# Patient Record
Sex: MALE | Race: WHITE | NOT HISPANIC OR LATINO | Employment: UNEMPLOYED | URBAN - METROPOLITAN AREA
[De-identification: names, ages, dates, MRNs, and addresses within clinical notes are randomized per-mention and may not be internally consistent; named-entity substitution may affect disease eponyms.]

---

## 2020-09-28 ENCOUNTER — HOSPITAL ENCOUNTER (INPATIENT)
Facility: MEDICAL CENTER | Age: 50
LOS: 3 days | DRG: 482 | End: 2020-10-01
Attending: EMERGENCY MEDICINE | Admitting: STUDENT IN AN ORGANIZED HEALTH CARE EDUCATION/TRAINING PROGRAM
Payer: COMMERCIAL

## 2020-09-28 ENCOUNTER — APPOINTMENT (OUTPATIENT)
Dept: RADIOLOGY | Facility: MEDICAL CENTER | Age: 50
DRG: 482 | End: 2020-09-28
Attending: EMERGENCY MEDICINE
Payer: COMMERCIAL

## 2020-09-28 DIAGNOSIS — S72.002D CLOSED FRACTURE OF NECK OF LEFT FEMUR WITH ROUTINE HEALING, SUBSEQUENT ENCOUNTER: ICD-10-CM

## 2020-09-28 PROBLEM — S72.002A CLOSED FRACTURE OF NECK OF LEFT FEMUR (HCC): Status: ACTIVE | Noted: 2020-09-28

## 2020-09-28 LAB — COVID ORDER STATUS COVID19: NORMAL

## 2020-09-28 PROCEDURE — U0003 INFECTIOUS AGENT DETECTION BY NUCLEIC ACID (DNA OR RNA); SEVERE ACUTE RESPIRATORY SYNDROME CORONAVIRUS 2 (SARS-COV-2) (CORONAVIRUS DISEASE [COVID-19]), AMPLIFIED PROBE TECHNIQUE, MAKING USE OF HIGH THROUGHPUT TECHNOLOGIES AS DESCRIBED BY CMS-2020-01-R: HCPCS

## 2020-09-28 PROCEDURE — 73502 X-RAY EXAM HIP UNI 2-3 VIEWS: CPT | Mod: LT

## 2020-09-28 PROCEDURE — 96374 THER/PROPH/DIAG INJ IV PUSH: CPT

## 2020-09-28 PROCEDURE — C9803 HOPD COVID-19 SPEC COLLECT: HCPCS | Performed by: STUDENT IN AN ORGANIZED HEALTH CARE EDUCATION/TRAINING PROGRAM

## 2020-09-28 PROCEDURE — 96372 THER/PROPH/DIAG INJ SC/IM: CPT

## 2020-09-28 PROCEDURE — 700111 HCHG RX REV CODE 636 W/ 250 OVERRIDE (IP): Performed by: EMERGENCY MEDICINE

## 2020-09-28 PROCEDURE — 99223 1ST HOSP IP/OBS HIGH 75: CPT | Performed by: STUDENT IN AN ORGANIZED HEALTH CARE EDUCATION/TRAINING PROGRAM

## 2020-09-28 PROCEDURE — 700111 HCHG RX REV CODE 636 W/ 250 OVERRIDE (IP): Performed by: STUDENT IN AN ORGANIZED HEALTH CARE EDUCATION/TRAINING PROGRAM

## 2020-09-28 PROCEDURE — 99285 EMERGENCY DEPT VISIT HI MDM: CPT

## 2020-09-28 PROCEDURE — 770006 HCHG ROOM/CARE - MED/SURG/GYN SEMI*

## 2020-09-28 RX ORDER — POLYETHYLENE GLYCOL 3350 17 G/17G
1 POWDER, FOR SOLUTION ORAL
Status: DISCONTINUED | OUTPATIENT
Start: 2020-09-28 | End: 2020-10-01 | Stop reason: HOSPADM

## 2020-09-28 RX ORDER — OXYCODONE HYDROCHLORIDE 5 MG/1
5 TABLET ORAL
Status: DISCONTINUED | OUTPATIENT
Start: 2020-09-28 | End: 2020-10-01 | Stop reason: HOSPADM

## 2020-09-28 RX ORDER — BISACODYL 10 MG
10 SUPPOSITORY, RECTAL RECTAL
Status: DISCONTINUED | OUTPATIENT
Start: 2020-09-28 | End: 2020-10-01 | Stop reason: HOSPADM

## 2020-09-28 RX ORDER — ONDANSETRON 2 MG/ML
4 INJECTION INTRAMUSCULAR; INTRAVENOUS EVERY 4 HOURS PRN
Status: DISCONTINUED | OUTPATIENT
Start: 2020-09-28 | End: 2020-10-01 | Stop reason: HOSPADM

## 2020-09-28 RX ORDER — PROCHLORPERAZINE EDISYLATE 5 MG/ML
5-10 INJECTION INTRAMUSCULAR; INTRAVENOUS EVERY 4 HOURS PRN
Status: DISCONTINUED | OUTPATIENT
Start: 2020-09-28 | End: 2020-10-01 | Stop reason: HOSPADM

## 2020-09-28 RX ORDER — PROMETHAZINE HYDROCHLORIDE 12.5 MG/1
12.5-25 SUPPOSITORY RECTAL EVERY 4 HOURS PRN
Status: DISCONTINUED | OUTPATIENT
Start: 2020-09-28 | End: 2020-10-01 | Stop reason: HOSPADM

## 2020-09-28 RX ORDER — PROMETHAZINE HYDROCHLORIDE 25 MG/1
12.5-25 TABLET ORAL EVERY 4 HOURS PRN
Status: DISCONTINUED | OUTPATIENT
Start: 2020-09-28 | End: 2020-10-01 | Stop reason: HOSPADM

## 2020-09-28 RX ORDER — SODIUM CHLORIDE, SODIUM LACTATE, POTASSIUM CHLORIDE, CALCIUM CHLORIDE 600; 310; 30; 20 MG/100ML; MG/100ML; MG/100ML; MG/100ML
INJECTION, SOLUTION INTRAVENOUS CONTINUOUS
Status: DISCONTINUED | OUTPATIENT
Start: 2020-09-28 | End: 2020-09-30

## 2020-09-28 RX ORDER — OXYCODONE HYDROCHLORIDE 5 MG/1
2.5 TABLET ORAL
Status: DISCONTINUED | OUTPATIENT
Start: 2020-09-28 | End: 2020-10-01 | Stop reason: HOSPADM

## 2020-09-28 RX ORDER — MORPHINE SULFATE 4 MG/ML
2 INJECTION, SOLUTION INTRAMUSCULAR; INTRAVENOUS
Status: DISCONTINUED | OUTPATIENT
Start: 2020-09-28 | End: 2020-10-01 | Stop reason: HOSPADM

## 2020-09-28 RX ORDER — ONDANSETRON 4 MG/1
4 TABLET, ORALLY DISINTEGRATING ORAL EVERY 4 HOURS PRN
Status: DISCONTINUED | OUTPATIENT
Start: 2020-09-28 | End: 2020-10-01 | Stop reason: HOSPADM

## 2020-09-28 RX ORDER — ENALAPRILAT 1.25 MG/ML
1.25 INJECTION INTRAVENOUS EVERY 6 HOURS PRN
Status: DISCONTINUED | OUTPATIENT
Start: 2020-09-28 | End: 2020-10-01 | Stop reason: HOSPADM

## 2020-09-28 RX ORDER — AMOXICILLIN 250 MG
2 CAPSULE ORAL 2 TIMES DAILY
Status: DISCONTINUED | OUTPATIENT
Start: 2020-09-28 | End: 2020-10-01 | Stop reason: HOSPADM

## 2020-09-28 RX ORDER — ACETAMINOPHEN 325 MG/1
650 TABLET ORAL EVERY 6 HOURS PRN
Status: DISCONTINUED | OUTPATIENT
Start: 2020-09-28 | End: 2020-10-01 | Stop reason: HOSPADM

## 2020-09-28 RX ADMIN — FENTANYL CITRATE 100 MCG: 50 INJECTION INTRAMUSCULAR; INTRAVENOUS at 19:31

## 2020-09-28 RX ADMIN — MORPHINE SULFATE 4 MG: 4 INJECTION INTRAVENOUS at 22:55

## 2020-09-28 SDOH — HEALTH STABILITY: MENTAL HEALTH: HOW OFTEN DO YOU HAVE A DRINK CONTAINING ALCOHOL?: NEVER

## 2020-09-28 ASSESSMENT — ENCOUNTER SYMPTOMS
FOCAL WEAKNESS: 1
FALLS: 1

## 2020-09-28 ASSESSMENT — PAIN DESCRIPTION - PAIN TYPE: TYPE: ACUTE PAIN

## 2020-09-29 ENCOUNTER — APPOINTMENT (OUTPATIENT)
Dept: RADIOLOGY | Facility: MEDICAL CENTER | Age: 50
DRG: 482 | End: 2020-09-29
Attending: ORTHOPAEDIC SURGERY
Payer: COMMERCIAL

## 2020-09-29 ENCOUNTER — ANESTHESIA EVENT (OUTPATIENT)
Dept: SURGERY | Facility: MEDICAL CENTER | Age: 50
DRG: 482 | End: 2020-09-29
Payer: COMMERCIAL

## 2020-09-29 ENCOUNTER — ANESTHESIA (OUTPATIENT)
Dept: SURGERY | Facility: MEDICAL CENTER | Age: 50
DRG: 482 | End: 2020-09-29
Payer: COMMERCIAL

## 2020-09-29 LAB
ALBUMIN SERPL BCP-MCNC: 4.2 G/DL (ref 3.2–4.9)
ALBUMIN/GLOB SERPL: 1.9 G/DL
ALP SERPL-CCNC: 88 U/L (ref 30–99)
ALT SERPL-CCNC: 18 U/L (ref 2–50)
ANION GAP SERPL CALC-SCNC: 15 MMOL/L (ref 7–16)
AST SERPL-CCNC: 25 U/L (ref 12–45)
BASOPHILS # BLD AUTO: 0.6 % (ref 0–1.8)
BASOPHILS # BLD: 0.08 K/UL (ref 0–0.12)
BILIRUB SERPL-MCNC: 0.6 MG/DL (ref 0.1–1.5)
BUN SERPL-MCNC: 16 MG/DL (ref 8–22)
CALCIUM SERPL-MCNC: 8.8 MG/DL (ref 8.5–10.5)
CHLORIDE SERPL-SCNC: 100 MMOL/L (ref 96–112)
CHOLEST SERPL-MCNC: 125 MG/DL (ref 100–199)
CO2 SERPL-SCNC: 22 MMOL/L (ref 20–33)
CREAT SERPL-MCNC: 0.52 MG/DL (ref 0.5–1.4)
EOSINOPHIL # BLD AUTO: 0.02 K/UL (ref 0–0.51)
EOSINOPHIL NFR BLD: 0.1 % (ref 0–6.9)
ERYTHROCYTE [DISTWIDTH] IN BLOOD BY AUTOMATED COUNT: 41.8 FL (ref 35.9–50)
GLOBULIN SER CALC-MCNC: 2.2 G/DL (ref 1.9–3.5)
GLUCOSE SERPL-MCNC: 117 MG/DL (ref 65–99)
HCT VFR BLD AUTO: 41.1 % (ref 42–52)
HDLC SERPL-MCNC: 47 MG/DL
HGB BLD-MCNC: 13.8 G/DL (ref 14–18)
IMM GRANULOCYTES # BLD AUTO: 0.06 K/UL (ref 0–0.11)
IMM GRANULOCYTES NFR BLD AUTO: 0.4 % (ref 0–0.9)
INR PPP: 1.07 (ref 0.87–1.13)
LDLC SERPL CALC-MCNC: 72 MG/DL
LYMPHOCYTES # BLD AUTO: 1.44 K/UL (ref 1–4.8)
LYMPHOCYTES NFR BLD: 10.2 % (ref 22–41)
MCH RBC QN AUTO: 30.4 PG (ref 27–33)
MCHC RBC AUTO-ENTMCNC: 33.6 G/DL (ref 33.7–35.3)
MCV RBC AUTO: 90.5 FL (ref 81.4–97.8)
MONOCYTES # BLD AUTO: 0.85 K/UL (ref 0–0.85)
MONOCYTES NFR BLD AUTO: 6 % (ref 0–13.4)
NEUTROPHILS # BLD AUTO: 11.63 K/UL (ref 1.82–7.42)
NEUTROPHILS NFR BLD: 82.7 % (ref 44–72)
NRBC # BLD AUTO: 0 K/UL
NRBC BLD-RTO: 0 /100 WBC
PLATELET # BLD AUTO: 255 K/UL (ref 164–446)
PMV BLD AUTO: 10.3 FL (ref 9–12.9)
POTASSIUM SERPL-SCNC: 4 MMOL/L (ref 3.6–5.5)
PROT SERPL-MCNC: 6.4 G/DL (ref 6–8.2)
PROTHROMBIN TIME: 14.2 SEC (ref 12–14.6)
RBC # BLD AUTO: 4.54 M/UL (ref 4.7–6.1)
SARS-COV-2 RNA RESP QL NAA+PROBE: NOTDETECTED
SODIUM SERPL-SCNC: 137 MMOL/L (ref 135–145)
SPECIMEN SOURCE: NORMAL
TRIGL SERPL-MCNC: 28 MG/DL (ref 0–149)
WBC # BLD AUTO: 14.1 K/UL (ref 4.8–10.8)

## 2020-09-29 PROCEDURE — 160029 HCHG SURGERY MINUTES - 1ST 30 MINS LEVEL 4: Performed by: ORTHOPAEDIC SURGERY

## 2020-09-29 PROCEDURE — 99231 SBSQ HOSP IP/OBS SF/LOW 25: CPT | Performed by: STUDENT IN AN ORGANIZED HEALTH CARE EDUCATION/TRAINING PROGRAM

## 2020-09-29 PROCEDURE — C1713 ANCHOR/SCREW BN/BN,TIS/BN: HCPCS | Performed by: ORTHOPAEDIC SURGERY

## 2020-09-29 PROCEDURE — 770006 HCHG ROOM/CARE - MED/SURG/GYN SEMI*

## 2020-09-29 PROCEDURE — 73502 X-RAY EXAM HIP UNI 2-3 VIEWS: CPT | Mod: LT

## 2020-09-29 PROCEDURE — 80053 COMPREHEN METABOLIC PANEL: CPT

## 2020-09-29 PROCEDURE — 501838 HCHG SUTURE GENERAL: Performed by: ORTHOPAEDIC SURGERY

## 2020-09-29 PROCEDURE — 0QS736Z REPOSITION LEFT UPPER FEMUR WITH INTRAMEDULLARY INTERNAL FIXATION DEVICE, PERCUTANEOUS APPROACH: ICD-10-PCS | Performed by: ORTHOPAEDIC SURGERY

## 2020-09-29 PROCEDURE — 160048 HCHG OR STATISTICAL LEVEL 1-5: Performed by: ORTHOPAEDIC SURGERY

## 2020-09-29 PROCEDURE — A9270 NON-COVERED ITEM OR SERVICE: HCPCS | Performed by: STUDENT IN AN ORGANIZED HEALTH CARE EDUCATION/TRAINING PROGRAM

## 2020-09-29 PROCEDURE — 160035 HCHG PACU - 1ST 60 MINS PHASE I: Performed by: ORTHOPAEDIC SURGERY

## 2020-09-29 PROCEDURE — 160009 HCHG ANES TIME/MIN: Performed by: ORTHOPAEDIC SURGERY

## 2020-09-29 PROCEDURE — 160041 HCHG SURGERY MINUTES - EA ADDL 1 MIN LEVEL 4: Performed by: ORTHOPAEDIC SURGERY

## 2020-09-29 PROCEDURE — 80061 LIPID PANEL: CPT

## 2020-09-29 PROCEDURE — 85025 COMPLETE CBC W/AUTO DIFF WBC: CPT

## 2020-09-29 PROCEDURE — 700111 HCHG RX REV CODE 636 W/ 250 OVERRIDE (IP): Performed by: ANESTHESIOLOGY

## 2020-09-29 PROCEDURE — 160002 HCHG RECOVERY MINUTES (STAT): Performed by: ORTHOPAEDIC SURGERY

## 2020-09-29 PROCEDURE — 85610 PROTHROMBIN TIME: CPT

## 2020-09-29 PROCEDURE — 700105 HCHG RX REV CODE 258: Performed by: ANESTHESIOLOGY

## 2020-09-29 PROCEDURE — 700101 HCHG RX REV CODE 250: Performed by: ANESTHESIOLOGY

## 2020-09-29 PROCEDURE — 500891 HCHG PACK, ORTHO MAJOR: Performed by: ORTHOPAEDIC SURGERY

## 2020-09-29 PROCEDURE — 700102 HCHG RX REV CODE 250 W/ 637 OVERRIDE(OP): Performed by: STUDENT IN AN ORGANIZED HEALTH CARE EDUCATION/TRAINING PROGRAM

## 2020-09-29 PROCEDURE — 700102 HCHG RX REV CODE 250 W/ 637 OVERRIDE(OP): Performed by: ANESTHESIOLOGY

## 2020-09-29 PROCEDURE — 700111 HCHG RX REV CODE 636 W/ 250 OVERRIDE (IP): Performed by: STUDENT IN AN ORGANIZED HEALTH CARE EDUCATION/TRAINING PROGRAM

## 2020-09-29 PROCEDURE — 36415 COLL VENOUS BLD VENIPUNCTURE: CPT

## 2020-09-29 PROCEDURE — 700105 HCHG RX REV CODE 258: Performed by: STUDENT IN AN ORGANIZED HEALTH CARE EDUCATION/TRAINING PROGRAM

## 2020-09-29 PROCEDURE — A9270 NON-COVERED ITEM OR SERVICE: HCPCS | Performed by: ANESTHESIOLOGY

## 2020-09-29 DEVICE — SCREW CROSS LOCK 5MM X 30MM (4TX5=20): Type: IMPLANTABLE DEVICE | Site: HIP | Status: FUNCTIONAL

## 2020-09-29 DEVICE — NAIL HIP 127.5 DEGREE 10MM X 180MM (4TX2=8): Type: IMPLANTABLE DEVICE | Site: HIP | Status: FUNCTIONAL

## 2020-09-29 DEVICE — SCREW LAG 10.5MM X 100MM (4TX2=8): Type: IMPLANTABLE DEVICE | Site: HIP | Status: FUNCTIONAL

## 2020-09-29 RX ORDER — DEXAMETHASONE SODIUM PHOSPHATE 4 MG/ML
INJECTION, SOLUTION INTRA-ARTICULAR; INTRALESIONAL; INTRAMUSCULAR; INTRAVENOUS; SOFT TISSUE PRN
Status: DISCONTINUED | OUTPATIENT
Start: 2020-09-29 | End: 2020-09-29 | Stop reason: SURG

## 2020-09-29 RX ORDER — HYDROMORPHONE HYDROCHLORIDE 1 MG/ML
0.4 INJECTION, SOLUTION INTRAMUSCULAR; INTRAVENOUS; SUBCUTANEOUS
Status: DISCONTINUED | OUTPATIENT
Start: 2020-09-29 | End: 2020-09-29 | Stop reason: HOSPADM

## 2020-09-29 RX ORDER — CEFAZOLIN SODIUM 1 G/50ML
1 INJECTION, SOLUTION INTRAVENOUS EVERY 8 HOURS
Status: COMPLETED | OUTPATIENT
Start: 2020-09-29 | End: 2020-09-30

## 2020-09-29 RX ORDER — KETOROLAC TROMETHAMINE 30 MG/ML
INJECTION, SOLUTION INTRAMUSCULAR; INTRAVENOUS PRN
Status: DISCONTINUED | OUTPATIENT
Start: 2020-09-29 | End: 2020-09-29 | Stop reason: SURG

## 2020-09-29 RX ORDER — MIDAZOLAM HYDROCHLORIDE 1 MG/ML
1 INJECTION INTRAMUSCULAR; INTRAVENOUS
Status: DISCONTINUED | OUTPATIENT
Start: 2020-09-29 | End: 2020-09-29 | Stop reason: HOSPADM

## 2020-09-29 RX ORDER — CEFAZOLIN SODIUM 1 G/3ML
INJECTION, POWDER, FOR SOLUTION INTRAMUSCULAR; INTRAVENOUS PRN
Status: DISCONTINUED | OUTPATIENT
Start: 2020-09-29 | End: 2020-09-29 | Stop reason: SURG

## 2020-09-29 RX ORDER — HYDROMORPHONE HYDROCHLORIDE 1 MG/ML
0.1 INJECTION, SOLUTION INTRAMUSCULAR; INTRAVENOUS; SUBCUTANEOUS
Status: DISCONTINUED | OUTPATIENT
Start: 2020-09-29 | End: 2020-09-29 | Stop reason: HOSPADM

## 2020-09-29 RX ORDER — SODIUM CHLORIDE, SODIUM LACTATE, POTASSIUM CHLORIDE, CALCIUM CHLORIDE 600; 310; 30; 20 MG/100ML; MG/100ML; MG/100ML; MG/100ML
INJECTION, SOLUTION INTRAVENOUS CONTINUOUS
Status: DISCONTINUED | OUTPATIENT
Start: 2020-09-29 | End: 2020-09-29 | Stop reason: HOSPADM

## 2020-09-29 RX ORDER — ONDANSETRON 2 MG/ML
4 INJECTION INTRAMUSCULAR; INTRAVENOUS
Status: DISCONTINUED | OUTPATIENT
Start: 2020-09-29 | End: 2020-09-29 | Stop reason: HOSPADM

## 2020-09-29 RX ORDER — METOPROLOL TARTRATE 1 MG/ML
1 INJECTION, SOLUTION INTRAVENOUS
Status: DISCONTINUED | OUTPATIENT
Start: 2020-09-29 | End: 2020-09-29 | Stop reason: HOSPADM

## 2020-09-29 RX ORDER — MEPERIDINE HYDROCHLORIDE 25 MG/ML
12.5 INJECTION INTRAMUSCULAR; INTRAVENOUS; SUBCUTANEOUS
Status: DISCONTINUED | OUTPATIENT
Start: 2020-09-29 | End: 2020-09-29 | Stop reason: HOSPADM

## 2020-09-29 RX ORDER — HALOPERIDOL 5 MG/ML
1 INJECTION INTRAMUSCULAR
Status: DISCONTINUED | OUTPATIENT
Start: 2020-09-29 | End: 2020-09-29 | Stop reason: HOSPADM

## 2020-09-29 RX ORDER — OXYCODONE HCL 5 MG/5 ML
5 SOLUTION, ORAL ORAL
Status: COMPLETED | OUTPATIENT
Start: 2020-09-29 | End: 2020-09-29

## 2020-09-29 RX ORDER — OXYCODONE HCL 5 MG/5 ML
10 SOLUTION, ORAL ORAL
Status: COMPLETED | OUTPATIENT
Start: 2020-09-29 | End: 2020-09-29

## 2020-09-29 RX ORDER — LABETALOL HYDROCHLORIDE 5 MG/ML
5 INJECTION, SOLUTION INTRAVENOUS
Status: DISCONTINUED | OUTPATIENT
Start: 2020-09-29 | End: 2020-09-29 | Stop reason: HOSPADM

## 2020-09-29 RX ORDER — HYDROMORPHONE HYDROCHLORIDE 1 MG/ML
0.2 INJECTION, SOLUTION INTRAMUSCULAR; INTRAVENOUS; SUBCUTANEOUS
Status: DISCONTINUED | OUTPATIENT
Start: 2020-09-29 | End: 2020-09-29 | Stop reason: HOSPADM

## 2020-09-29 RX ORDER — SODIUM CHLORIDE, SODIUM LACTATE, POTASSIUM CHLORIDE, CALCIUM CHLORIDE 600; 310; 30; 20 MG/100ML; MG/100ML; MG/100ML; MG/100ML
INJECTION, SOLUTION INTRAVENOUS
Status: DISCONTINUED | OUTPATIENT
Start: 2020-09-29 | End: 2020-09-29 | Stop reason: SURG

## 2020-09-29 RX ORDER — ONDANSETRON 2 MG/ML
INJECTION INTRAMUSCULAR; INTRAVENOUS PRN
Status: DISCONTINUED | OUTPATIENT
Start: 2020-09-29 | End: 2020-09-29 | Stop reason: SURG

## 2020-09-29 RX ORDER — DIPHENHYDRAMINE HYDROCHLORIDE 50 MG/ML
12.5 INJECTION INTRAMUSCULAR; INTRAVENOUS
Status: DISCONTINUED | OUTPATIENT
Start: 2020-09-29 | End: 2020-09-29 | Stop reason: HOSPADM

## 2020-09-29 RX ORDER — LIDOCAINE HYDROCHLORIDE 20 MG/ML
INJECTION, SOLUTION EPIDURAL; INFILTRATION; INTRACAUDAL; PERINEURAL PRN
Status: DISCONTINUED | OUTPATIENT
Start: 2020-09-29 | End: 2020-09-29 | Stop reason: SURG

## 2020-09-29 RX ADMIN — MORPHINE SULFATE 2 MG: 4 INJECTION INTRAVENOUS at 02:21

## 2020-09-29 RX ADMIN — SODIUM CHLORIDE, POTASSIUM CHLORIDE, SODIUM LACTATE AND CALCIUM CHLORIDE: 600; 310; 30; 20 INJECTION, SOLUTION INTRAVENOUS at 00:11

## 2020-09-29 RX ADMIN — DOCUSATE SODIUM 50 MG AND SENNOSIDES 8.6 MG 2 TABLET: 8.6; 5 TABLET, FILM COATED ORAL at 17:09

## 2020-09-29 RX ADMIN — ACETAMINOPHEN 650 MG: 325 TABLET, FILM COATED ORAL at 08:32

## 2020-09-29 RX ADMIN — FENTANYL CITRATE 50 MCG: 50 INJECTION, SOLUTION INTRAMUSCULAR; INTRAVENOUS at 14:54

## 2020-09-29 RX ADMIN — FENTANYL CITRATE 50 MCG: 50 INJECTION, SOLUTION INTRAMUSCULAR; INTRAVENOUS at 14:48

## 2020-09-29 RX ADMIN — SODIUM CHLORIDE, POTASSIUM CHLORIDE, SODIUM LACTATE AND CALCIUM CHLORIDE: 600; 310; 30; 20 INJECTION, SOLUTION INTRAVENOUS at 00:10

## 2020-09-29 RX ADMIN — DEXAMETHASONE SODIUM PHOSPHATE 8 MG: 4 INJECTION, SOLUTION INTRA-ARTICULAR; INTRALESIONAL; INTRAMUSCULAR; INTRAVENOUS; SOFT TISSUE at 14:36

## 2020-09-29 RX ADMIN — ONDANSETRON 4 MG: 2 INJECTION INTRAMUSCULAR; INTRAVENOUS at 14:58

## 2020-09-29 RX ADMIN — SODIUM CHLORIDE, POTASSIUM CHLORIDE, SODIUM LACTATE AND CALCIUM CHLORIDE: 600; 310; 30; 20 INJECTION, SOLUTION INTRAVENOUS at 19:52

## 2020-09-29 RX ADMIN — FENTANYL CITRATE 100 MCG: 50 INJECTION, SOLUTION INTRAMUSCULAR; INTRAVENOUS at 14:36

## 2020-09-29 RX ADMIN — KETOROLAC TROMETHAMINE 30 MG: 30 INJECTION, SOLUTION INTRAMUSCULAR at 14:58

## 2020-09-29 RX ADMIN — LIDOCAINE HYDROCHLORIDE 60 MG: 20 INJECTION, SOLUTION EPIDURAL; INFILTRATION; INTRACAUDAL at 14:36

## 2020-09-29 RX ADMIN — ACETAMINOPHEN 650 MG: 325 TABLET, FILM COATED ORAL at 00:20

## 2020-09-29 RX ADMIN — PROPOFOL 150 MG: 10 INJECTION, EMULSION INTRAVENOUS at 14:36

## 2020-09-29 RX ADMIN — EPHEDRINE SULFATE 10 MG: 50 INJECTION, SOLUTION INTRAVENOUS at 14:38

## 2020-09-29 RX ADMIN — SODIUM CHLORIDE, POTASSIUM CHLORIDE, SODIUM LACTATE AND CALCIUM CHLORIDE: 600; 310; 30; 20 INJECTION, SOLUTION INTRAVENOUS at 14:30

## 2020-09-29 RX ADMIN — PROPOFOL 50 MG: 10 INJECTION, EMULSION INTRAVENOUS at 14:54

## 2020-09-29 RX ADMIN — SODIUM CHLORIDE, POTASSIUM CHLORIDE, SODIUM LACTATE AND CALCIUM CHLORIDE: 600; 310; 30; 20 INJECTION, SOLUTION INTRAVENOUS at 17:09

## 2020-09-29 RX ADMIN — CEFAZOLIN 2 G: 330 INJECTION, POWDER, FOR SOLUTION INTRAMUSCULAR; INTRAVENOUS at 14:36

## 2020-09-29 RX ADMIN — OXYCODONE HYDROCHLORIDE 5 MG: 5 SOLUTION ORAL at 16:05

## 2020-09-29 SDOH — ECONOMIC STABILITY: TRANSPORTATION INSECURITY
IN THE PAST 12 MONTHS, HAS THE LACK OF TRANSPORTATION KEPT YOU FROM MEDICAL APPOINTMENTS OR FROM GETTING MEDICATIONS?: NO

## 2020-09-29 SDOH — ECONOMIC STABILITY: FOOD INSECURITY: WITHIN THE PAST 12 MONTHS, YOU WORRIED THAT YOUR FOOD WOULD RUN OUT BEFORE YOU GOT MONEY TO BUY MORE.: NEVER TRUE

## 2020-09-29 SDOH — ECONOMIC STABILITY: FOOD INSECURITY: WITHIN THE PAST 12 MONTHS, THE FOOD YOU BOUGHT JUST DIDN'T LAST AND YOU DIDN'T HAVE MONEY TO GET MORE.: NEVER TRUE

## 2020-09-29 SDOH — ECONOMIC STABILITY: TRANSPORTATION INSECURITY
IN THE PAST 12 MONTHS, HAS LACK OF TRANSPORTATION KEPT YOU FROM MEETINGS, WORK, OR FROM GETTING THINGS NEEDED FOR DAILY LIVING?: NO

## 2020-09-29 ASSESSMENT — ENCOUNTER SYMPTOMS
NAUSEA: 0
DIZZINESS: 0
BACK PAIN: 0
INSOMNIA: 0
BLURRED VISION: 0
FALLS: 1
HEADACHES: 0
VOMITING: 0
CHILLS: 0
EYE PAIN: 0
COUGH: 0
SHORTNESS OF BREATH: 0
ABDOMINAL PAIN: 0
FEVER: 0
PALPITATIONS: 0

## 2020-09-29 ASSESSMENT — COGNITIVE AND FUNCTIONAL STATUS - GENERAL
MOVING FROM LYING ON BACK TO SITTING ON SIDE OF FLAT BED: A LOT
MOBILITY SCORE: 13
STANDING UP FROM CHAIR USING ARMS: A LOT
SUGGESTED CMS G CODE MODIFIER DAILY ACTIVITY: CK
DAILY ACTIVITIY SCORE: 16
DRESSING REGULAR LOWER BODY CLOTHING: A LOT
HELP NEEDED FOR BATHING: A LOT
TOILETING: A LOT
DRESSING REGULAR UPPER BODY CLOTHING: A LITTLE
MOVING TO AND FROM BED TO CHAIR: A LOT
SUGGESTED CMS G CODE MODIFIER MOBILITY: CL
TURNING FROM BACK TO SIDE WHILE IN FLAT BAD: A LITTLE
WALKING IN HOSPITAL ROOM: A LOT
PERSONAL GROOMING: A LITTLE
CLIMB 3 TO 5 STEPS WITH RAILING: A LOT

## 2020-09-29 ASSESSMENT — PATIENT HEALTH QUESTIONNAIRE - PHQ9
2. FEELING DOWN, DEPRESSED, IRRITABLE, OR HOPELESS: NOT AT ALL
1. LITTLE INTEREST OR PLEASURE IN DOING THINGS: NOT AT ALL
SUM OF ALL RESPONSES TO PHQ9 QUESTIONS 1 AND 2: 0

## 2020-09-29 ASSESSMENT — LIFESTYLE VARIABLES
SUBSTANCE_ABUSE: 0
TOTAL SCORE: 0
CONSUMPTION TOTAL: NEGATIVE
HOW MANY TIMES IN THE PAST YEAR HAVE YOU HAD 5 OR MORE DRINKS IN A DAY: 0
TOTAL SCORE: 0
DOES PATIENT WANT TO STOP DRINKING: NO
HAVE PEOPLE ANNOYED YOU BY CRITICIZING YOUR DRINKING: NO
REASON UNABLE TO ASSESS: NPO
HAVE YOU EVER FELT YOU SHOULD CUT DOWN ON YOUR DRINKING: NO
EVER HAD A DRINK FIRST THING IN THE MORNING TO STEADY YOUR NERVES TO GET RID OF A HANGOVER: NO
ALCOHOL_USE: NO
EVER FELT BAD OR GUILTY ABOUT YOUR DRINKING: NO
TOTAL SCORE: 0
AVERAGE NUMBER OF DAYS PER WEEK YOU HAVE A DRINK CONTAINING ALCOHOL: 0
ON A TYPICAL DAY WHEN YOU DRINK ALCOHOL HOW MANY DRINKS DO YOU HAVE: 0

## 2020-09-29 ASSESSMENT — PAIN DESCRIPTION - PAIN TYPE
TYPE: ACUTE PAIN
TYPE: ACUTE PAIN
TYPE: SURGICAL PAIN
TYPE: SURGICAL PAIN
TYPE: ACUTE PAIN

## 2020-09-29 ASSESSMENT — PAIN SCALES - GENERAL: PAIN_LEVEL: 2

## 2020-09-29 NOTE — ANESTHESIA PROCEDURE NOTES
Airway    Date/Time: 9/29/2020 2:36 PM  Performed by: Jah Zamudio D.O.  Authorized by: Jah Zamudio D.O.     Location:  OR  Urgency:  Elective  Indications for Airway Management:  Anesthesia      Spontaneous Ventilation: absent    Sedation Level:  Deep  Preoxygenated: Yes    Final Airway Type:  Supraglottic airway  Final Supraglottic Airway:  Standard LMA    SGA Size:  5  Number of Attempts at Approach:  1

## 2020-09-29 NOTE — H&P
Hospital Medicine History & Physical Note    Date of Service  9/28/2020    Primary Care Physician  No primary care provider on file.    Consultants  orthopedics    Code Status  Full Code    Chief Complaint  Chief Complaint   Patient presents with   • T-5000 FALL     skateboarding and fell   • Hip Pain     can't bear weight.       History of Presenting Illness  50 y.o. male with no past medical history who presented following an accident on a skateboard this evening.  Patient is very active and a cyclist, and has been unable to bear weight since falling a few hours ago.  Xray performed in the ER show Impacted left intertrochanteric femoral neck fracture.  Sclerotic focus in the left femoral neck, commonly associated with bony island, consider other sclerotic bony lesion with additional workup as clinically appropriate.  Orthopedics is consulted and patient is admitted for pain management and treatment as recommended.       Review of Systems  Review of Systems   Musculoskeletal: Positive for falls and joint pain.   Neurological: Positive for focal weakness.   All other systems reviewed and are negative.      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   Constitutional:       General: He is in acute distress.      Appearance: Normal appearance. He is normal weight. He is not ill-appearing or toxic-appearing.   HENT:      Head: Normocephalic and atraumatic.      Left  Ear: External ear normal.      Nose: Nose normal.      Mouth/Throat:      Mouth: Mucous membranes are moist.      Pharynx: Oropharynx is clear.   Eyes:      Extraocular Movements: Extraocular movements intact.      Conjunctiva/sclera: Conjunctivae normal.      Pupils: Pupils are equal, round, and reactive to light.   Neck:      Musculoskeletal: Normal range of motion and neck supple.   Cardiovascular:      Rate and Rhythm: Normal rate and regular rhythm.      Pulses: Normal pulses.      Heart sounds: Normal heart sounds. No murmur. No friction rub. No gallop.    Pulmonary:      Effort: Pulmonary effort is normal. No respiratory distress.      Breath sounds: Normal breath sounds. No stridor. No wheezing, rhonchi or rales.   Abdominal:      General: Abdomen is flat. Bowel sounds are normal. There is no distension.      Palpations: Abdomen is soft. There is no mass.      Tenderness: There is no abdominal tenderness. There is no guarding.   Musculoskeletal:         General: Tenderness (at LLE, no erythema or lesion) present.      Left lower leg: No edema.   Skin:     General: Skin is warm and dry.      Capillary Refill: Capillary refill takes 2 to 3 seconds.      Findings: No rash.   Neurological:      Mental Status: He is alert and oriented to person, place, and time.      Motor: Weakness present.      Gait: Gait abnormal (unable to bear weight at LLE).   Psychiatric:         Mood and Affect: Mood normal.         Behavior: Behavior normal.         Laboratory:          No results for input(s): ALTSGPT, ASTSGOT, ALKPHOSPHAT, TBILIRUBIN, DBILIRUBIN, GAMMAGT, AMYLASE, LIPASE, ALB, PREALBUMIN, GLUCOSE in the last 72 hours.      No results for input(s): NTPROBNP in the last 72 hours.      No results for input(s): TROPONINT in the last 72 hours.    Imaging:  DX-HIP-COMPLETE - UNILATERAL 2+ LEFT   Final Result         1.  Impacted left intertrochanteric femoral neck fracture.   2.  Sclerotic focus in the left femoral neck,  commonly associated with bony island, consider other sclerotic bony lesion with additional workup as clinically appropriate.            Assessment/Plan:  I anticipate this patient will require at least two midnights for appropriate medical management, necessitating inpatient admission.    Closed fracture of neck of left femur (HCC)- (present on admission)  Assessment & Plan  Pain management  Bed rest  Awaiting orthopedic recommendations  NPO

## 2020-09-29 NOTE — CARE PLAN
Pt. Remains free of any new/additional injury since admission to floor. Remains in bed d/t left hip fx. Is currently NPO since 0000 for likely surgery in AM. Call light is in reach. Will monitor.

## 2020-09-29 NOTE — ANESTHESIA TIME REPORT
Anesthesia Start and Stop Event Times     Date Time Event    9/29/2020 1428 Ready for Procedure     1430 Anesthesia Start     1526 Anesthesia Stop        Responsible Staff  09/29/20    Name Role Begin End    Jah Zamudio D.O. Anesth 1430 1526        Preop Diagnosis (Free Text):  Pre-op Diagnosis     Left intertrochanteric hip fracture        Preop Diagnosis (Codes):    Post op Diagnosis  Closed left hip fracture (HCC)      Premium Reason  A. 3PM - 7AM    Comments:

## 2020-09-29 NOTE — CARE PLAN
Problem: Pain Management  Goal: Pain level will decrease to patient's comfort goal  Outcome: PROGRESSING AS EXPECTED  Note: Pt educated on non-pharmacological pain control interventions. Pain well controlled on oral pain medications.     Problem: Safety  Goal: Will remain free from falls  Outcome: PROGRESSING AS EXPECTED  Note: Bed locked and in lowest position. Call light and belongings within reach.     Problem: Communication  Goal: The ability to communicate needs accurately and effectively will improve  Outcome: PROGRESSING AS EXPECTED  Note: Pt updated on POC.

## 2020-09-29 NOTE — OP REPORT
DATE OF SERVICE:  09/29/2020    PREOPERATIVE DIAGNOSIS:  Left intertrochanteric hip fracture.    POSTOPERATIVE DIAGNOSIS:  Left intertrochanteric hip fracture.    SURGICAL PROCEDURES:  1.  Intramedullary nailing, left hip.    SURGEON:  Akash Driver MD    ASSISTANT:  None.    ANESTHESIOLOGIST:  Jah Zamudio DO    ANESTHETIC:  General.    ESTIMATED BLOOD LOSS:  100 mL    INDICATIONS:  The patient is 50 years old, fell off skateboard last night,   injuring his left hip, was found to have a minimally displaced   intertrochanteric hip fracture.  He was admitted to the hospital.  I was asked   by Dr. Stevenson to oversee his definitive orthopedic care.  I recommended   intramedullary nailing.  Risks include bleeding, infection, neurovascular   injury, pain, stiffness, arthritis, nonunion, malunion, fracture,   thromboembolic phenomena, anesthetic and medical complications, etc.    DESCRIPTION OF PROCEDURE:  The patient was identified in the preoperative   holding area.  Left leg was marked.  He was taken to the operating room where   general anesthetic was administered.  Intravenous antibiotics were given.  He   was placed supine on the fracture table.  Feet were placed in the boots.    Fluoroscopic images showed good reduction.  The lateral hip, thigh, and knee   was then prepped and draped in sterile fashion.  Time-out was held to confirm   the patient identity and correct surgical site.    A terminally threaded guidewire was inserted to the tip of the greater   trochanter and then inserted into the proximal femur with appropriate starting   angle.  Incision was made around the wire, then the starter reamer was used   over the wire to open up the medullary canal.  I then inserted an C short   hip nail to the appropriate depth.  I made an incision laterally through which   we placed interlocking sleeve and then we inserted a guidewire into the near   center-center position of the femoral head.  Cannulated  drill was used over   the wire, then a 105 mm lag screw was inserted.  The setscrew was tightened.    The guidewire and oblique interlocking sleeve was removed.  We then placed 1   interlocking screw through the jig and sleeve into the static hole distally.    The jig was removed.  Fluoroscopic images showed good reduction and implant   placement.  Wounds were irrigated and proximal fascia was closed with 0   Vicryl.  Skin was closed with 2-0 Vicryl and staples.  Dressings were applied.    The patient was taken off the fracture table, extubated, and taken to   recovery room in stable condition.    POSTOPERATIVE PLAN:  1.  Intravenous antibiotics for 24 hours.  2.  DVT prophylaxis with early mobilization, SCDs and Lovenox while inpatient,   aspirin 81 mg p.o. b.i.d. for 4 weeks after discharge.  3.  A 50% partial weightbearing for approximately 1 month and then progress to   full weightbearing as tolerated.       ____________________________________     MD JUVE OBRIEN / JOSE    DD:  09/29/2020 15:19:18  DT:  09/29/2020 16:58:19    D#:  [id number]  Job#:  [id number]

## 2020-09-29 NOTE — ED NOTES
Med rec updated and complete. Allergies reviewed. Pt denies taking medications.        Home pharmacy  [facility]

## 2020-09-29 NOTE — CONSULTS
9/28/2020    Time Called: 916PM  Time Arrived: 930PM    Reason for consultation: Left hip fracture    Consultation on Diego Giron at the request of Dr. Ayala for evaluation of left hip fracture.  The patient is a 50 y.o. male who presents with a 1 day history of left hip pain due to a fall while on a skateboard this afternoon.  Patient states he was riding a skateboard when he fell and landed on his left hip.  He was unable to ambulate thereafter, he was able to crawl to a car which brought him to the hospital.  He describes his pain as a deep and achy pain which is worse with motion, worse with attempted ambulation, and better with immobilization and rest.  He denies any other injuries at the time of the fall.  He denies any past medical history       History reviewed. No pertinent past medical history.    History reviewed. No pertinent surgical history.    Medications  No current facility-administered medications on file prior to encounter.      No current outpatient medications on file prior to encounter.       Allergies  Patient has no known allergies.    ROS  General Constitutional: Patient reports no changes in general health lately   Eyes: Patient denies vision abnormalities  ENT: Patient denies vertigo or balance issues   Cardiovascular: Patient currently denies chest pain  Respiratory: Patient currently denies shortness of breath   Gastrointestinal: Patient currently denies nausea/vomiting  Integumentary: no new skin lesions/rashes  Psychiatric: Patient denies h/o psychiatric conditions/mood changes  Hematologic / Lymphatic: Patient denies bleeding or bruising  Allergic / Immunologic: Patient denies recent immunologic problems    History reviewed. No pertinent family history.    Social History     Socioeconomic History   • Marital status: Single     Spouse name: Not on file   • Number of children: Not on file   • Years of education: Not on file   • Highest education level: Not on file  "  Occupational History   • Not on file   Social Needs   • Financial resource strain: Not on file   • Food insecurity     Worry: Not on file     Inability: Not on file   • Transportation needs     Medical: Not on file     Non-medical: Not on file   Tobacco Use   • Smoking status: Never Smoker   • Smokeless tobacco: Never Used   Substance and Sexual Activity   • Alcohol use: Never     Frequency: Never   • Drug use: Never   • Sexual activity: Not on file   Lifestyle   • Physical activity     Days per week: Not on file     Minutes per session: Not on file   • Stress: Not on file   Relationships   • Social connections     Talks on phone: Not on file     Gets together: Not on file     Attends [employer] service: Not on file     Active member of club or organization: Not on file     Attends meetings of clubs or organizations: Not on file     Relationship status: Not on file   • Intimate partner violence     Fear of current or ex partner: Not on file     Emotionally abused: Not on file     Physically abused: Not on file     Forced sexual activity: Not on file   Other Topics Concern   • Not on file   Social History Narrative   • Not on file       Physical Exam  Vitals  /85   Pulse (!) 103   Temp 37.2 °C (99 °F) (Temporal)   Resp 12   Ht 1.753 m (5' 9\")   Wt 65.8 kg (145 lb)   SpO2 98%   General: Well Developed, Well Nourished, no acute distress  Psychiatric: Alert and oriented x3, appropriate responses to questions, pleasant mood and affect.  HEENT: Normocephalic, atraumatic  Eyes: Anicteric, EOMI  Neck: Supple, trachea midline  Chest: Symmetric expansion of the chest wall  Heart: RRR, palpable peripheral pulses  Abdomen: Soft, NT, ND  Skin: Intact, no open wounds  Musculoskeletal: Bilateral upper extremity examination shows no bony tenderness to palpation through the shoulder girdle, elbows, wrists, hands.  Good strength and motion in the upper extremities.  Pelvis examination shows no pain with compression or " rotation of the pelvis  Right lower extremity examination shows no pain with logroll good strength and range of motion at the level of the hip knee ankle and foot, no bony tenderness throughout the right lower extremity.  Left lower extremity shows no open wounds or abrasions.  There is tenderness to palpation over the greater trochanter of the left hip, there is pain with logroll of the hip  There is good strength at the ankle, decrease strength at the knee, no pain throughout the foot  Neuro: Sensation is intact throughout bilateral upper and lower extremities, strength examination as above  Vascular: Palpable peripheral pulses, Capillary refill <2 seconds    Radiographs:  DX-HIP-COMPLETE - UNILATERAL 2+ LEFT   Final Result         1.  Impacted left intertrochanteric femoral neck fracture.   2.  Sclerotic focus in the left femoral neck, commonly associated with bony island, consider other sclerotic bony lesion with additional workup as clinically appropriate.          Laboratory Values      No results for input(s): SODIUM, POTASSIUM, CHLORIDE, CO2, GLUCOSE, BUN, CPKTOTAL in the last 72 hours.          Impression:    #1  Left intertrochanteric hip fracture    Plan:  I had a lengthy discussion today with the patient regarding the nature my clinical findings.  Given the inability to ambulate and the evidence of intertrochanteric hip fracture I would recommend surgical fixation of this to aid in pain control and ambulatory ability.  N.p.o. after midnight  Nonweightbearing left lower extremity  Patient is being admitted to the hospitalist service, appreciate management  Plan for surgery tomorrow with Dr. Driver.  All questions were answered    Obed Stevenson MD

## 2020-09-29 NOTE — ED TRIAGE NOTES
Pt to triage by WC  Chief Complaint   Patient presents with   • T-5000 FALL     skateboarding and fell   • Hip Pain     can't bear weight.   Pt reports that he had to call EMS to help him get back to his car and his partner drove him here.  Was able to muscle his way out of the car into a WC.      Can wiggle his toes on left side and has all feeling.  No deformity or rotation noted but pt is sitting and difficult to assess

## 2020-09-29 NOTE — THERAPY
Missed Therapy     Patient Name: Diego Giron  Age:  50 y.o., Sex:  male  Medical Record #: [id number]  Today's Date: 9/29/2020 09/29/20 0912   Interdisciplinary Plan of Care Collaboration   IDT Collaboration with  Nursing   Patient Position at End of Therapy In Bed   Collaboration Comments Hold PT, pt pending surgical intervention.

## 2020-09-29 NOTE — ANESTHESIA PREPROCEDURE EVALUATION
Left hip fx, otherwise very healthy and active. No prior GA. Denies: MI/CHF/smoking/CVA/DM/CKD      Relevant Problems   No relevant active problems       Physical Exam    Airway   Mallampati: II  TM distance: >3 FB  Neck ROM: full       Cardiovascular - normal exam  Rhythm: regular  Rate: normal  (-) murmur     Dental - normal exam           Pulmonary - normal exam  Breath sounds clear to auscultation     Abdominal    Neurological - normal exam                 Anesthesia Plan    ASA 1       Plan - general       Airway plan will be LMA        Induction: intravenous    Postoperative Plan: Postoperative administration of opioids is intended.    Pertinent diagnostic labs and testing reviewed    Informed Consent:    Anesthetic plan and risks discussed with patient.    Use of blood products discussed with: patient whom consented to blood products.

## 2020-09-29 NOTE — PROGRESS NOTES
[facility] Medicine Daily Progress Note    Date of Service  9/29/2020    Chief Complaint  50 y.o. male admitted 9/28/2020 with hip pain after ground level fall.    Hospital Course    50 y.o. male with no past medical history who presented following an accident on a skateboard this evening.  Patient is very active and a cyclist, and has been unable to bear weight since falling a few hours ago.  Xray performed in the ER show Impacted left intertrochanteric femoral neck fracture. Orthopedics is consulted and patient is admitted for pain management and treatment as recommended      Interval Problem Update  Admitted yesterday for left hip fracture, plan for surgical fixation today.  No acute events overnight. Patient is hungry, states he has leg pain with leg movement.    Consultants/Specialty  Orthopedic surgery    Code Status  Full Code    Disposition  Inpatient, pending surgery for hip fracture, then disposition based on PT/OT evaluation, likely discharge home.    Review of Systems  Review of Systems   Constitutional: Negative for chills and fever.   Eyes: Negative for blurred vision and pain.   Respiratory: Negative for cough and shortness of breath.    Cardiovascular: Negative for chest pain, palpitations and leg swelling.   Gastrointestinal: Negative for abdominal pain, nausea and vomiting.   Genitourinary: Negative for dysuria and urgency.   Musculoskeletal: Positive for falls and joint pain. Negative for back pain.   Skin: Negative for itching and rash.   Neurological: Negative for dizziness and headaches.   Psychiatric/Behavioral: Negative for substance abuse. The patient does not have insomnia.         Physical Exam  Temp:  [36.4 °C (97.6 °F)-37.2 °C (99 °F)] 36.4 °C (97.6 °F)  Pulse:  [] 71  Resp:  [12-16] 15  BP: (103-121)/(60-85) 107/67  SpO2:  [96 %-99 %] 96 %    Physical Exam  Constitutional:       General: He is not in acute distress.     Appearance: He is not ill-appearing.   HENT:      Head:  Normocephalic and atraumatic.      Right Ear: External ear normal.      Left Ear: External ear normal.      Mouth/Throat:      Pharynx: No oropharyngeal exudate or posterior oropharyngeal erythema.   Eyes:      Extraocular Movements: Extraocular movements intact.      Pupils: Pupils are equal, round, and reactive to light.   Neck:      Musculoskeletal: Normal range of motion and neck supple.   Cardiovascular:      Rate and Rhythm: Normal rate and regular rhythm.      Pulses: Normal pulses.      Heart sounds: Normal heart sounds.   Pulmonary:      Effort: Pulmonary effort is normal.      Breath sounds: Normal breath sounds.   Abdominal:      General: Bowel sounds are normal.      Palpations: Abdomen is soft.   Musculoskeletal:         General: No swelling or tenderness.      Right lower leg: No edema.      Left lower leg: No edema.      Comments: Pain with left hip rotation   Skin:     General: Skin is warm and dry.   Neurological:      General: No focal deficit present.      Mental Status: He is oriented to person, place, and time.   Psychiatric:         Mood and Affect: Mood normal.         Behavior: Behavior normal.         Fluids    Intake/Output Summary (Last 24 hours) at 9/29/2020 1110  Last data filed at 9/29/2020 0845  Gross per 24 hour   Intake --   Output 450 ml   Net -450 ml       Laboratory  Recent Labs     09/29/20  0342   WBC 14.1*   RBC 4.54*   HEMOGLOBIN 13.8*   HEMATOCRIT 41.1*   MCV 90.5   MCH 30.4   MCHC 33.6*   RDW 41.8   PLATELETCT 255   MPV 10.3     Recent Labs     09/29/20  0342   SODIUM 137   POTASSIUM 4.0   CHLORIDE 100   CO2 22   GLUCOSE 117*   BUN 16   CREATININE 0.52   CALCIUM 8.8     Recent Labs     09/29/20  0342   INR 1.07         Recent Labs     09/29/20  0342   TRIGLYCERIDE 28   HDL 47   LDL 72       Imaging  DX-HIP-COMPLETE - UNILATERAL 2+ LEFT   Final Result         1.  Impacted left intertrochanteric femoral neck fracture.   2.  Sclerotic focus in the left femoral neck, commonly  associated with bony island, consider other sclerotic bony lesion with additional workup as clinically appropriate.      DX-PORTABLE FLUORO > 1 HOUR    (Results Pending)   DX-HIP-UNILATERAL-W/O PELVIS-2/3 VIEWS LEFT    (Results Pending)        Assessment/Plan  Closed fracture of neck of left femur (HCC)- (present on admission)  Assessment & Plan  Surgical fixation today 9/29 per ortho  Pain management  NPO  Nonweightbearing left lower extremity  PT/OT evaluation after surgery       VTE prophylaxis: SCDs

## 2020-09-29 NOTE — ED PROVIDER NOTES
ED Provider Note    CHIEF COMPLAINT  Chief Complaint   Patient presents with   • T-5000 FALL     skateboarding and fell   • Hip Pain     can't bear weight.       HPI  Diego Giron is a 50 y.o. male who presents after fall from a skateboard.  Patient reports that he was skateboarding when he hit a stick falling forward, he reports he tried to catch himself and ran a few steps but then fell onto his left hip.  He reports he was unable to ambulate since that time, he did stand up and try to bear weight but this was too painful.  He is never injured his hip in the past.  He is not on any blood thinners and denies striking his head.  Patient denies any associated weakness or numbness.    REVIEW OF SYSTEMS  ROS  See HPI for further details. All other systems are negative.     PAST MEDICAL HISTORY       SOCIAL HISTORY  Social History     Tobacco Use   • Smoking status: Never Smoker   • Smokeless tobacco: Never Used   Substance and Sexual Activity   • Alcohol use: Never     Frequency: Never   • Drug use: Never   • Sexual activity: Not on file       SURGICAL HISTORY  patient denies any surgical history    CURRENT MEDICATIONS  Home Medications     Reviewed by Courtney Burgos R.N. (Registered Nurse) on 09/28/20 at 1748  Med List Status: <None>   Medication Last Dose Status        Patient Bala Taking any Medications                       ALLERGIES  No Known Allergies    PHYSICAL EXAM  Physical Exam   Constitutional: He is oriented to person, place, and time. He appears well-developed and well-nourished.   HENT:   Head: Normocephalic and atraumatic.   Eyes: Conjunctivae are normal.   Neck: Normal range of motion. Neck supple.   Pulmonary/Chest: Effort normal.   Musculoskeletal:      Comments: Cervical thoracic and lumbar spine are clear of any tenderness palpation or bony abnormality    Patient with mild pain to palpation of left greater trochanter.  Pain on logroll and axial loading of the left hip.  No pain to  palpation of the distal femur, knee, or ankle.  Distal pulses are 2+ cap refill is instantaneous tissues are warm well perfused compartments are soft and sensation is intact throughout.   Neurological: He is alert and oriented to person, place, and time.   Skin: Skin is warm.   Psychiatric: He has a normal mood and affect. His behavior is normal.     DX-HIP-COMPLETE - UNILATERAL 2+ LEFT   Final Result         1.  Impacted left intertrochanteric femoral neck fracture.   2.  Sclerotic focus in the left femoral neck, commonly associated with bony island, consider other sclerotic bony lesion with additional workup as clinically appropriate.            COURSE & MEDICAL DECISION MAKING  Pertinent Labs & Imaging studies reviewed. (See chart for details)    Well-appearing patient here with symptoms consistent with likely hip fracture versus dislocation.  Patient given 100 of fentanyl for his pain.  Will check x-ray of patient's hip.  Patient is neurovascularly intact.  Patient's x-ray does not fact reveal a hip fracture.  I discussed the case with orthopedics who is asked me to admit to the hospitalist.  I have discussed the case with hospitalist who has agreed to admit.     The patient will return for worsening symptoms and is stable at the time of discharge. The patient verbalizes understanding and will comply.    FINAL IMPRESSION  1.  Left femoral neck fracture      Electronically signed by: Jose Ayala M.D., 9/28/2020 7:30 PM

## 2020-09-29 NOTE — ASSESSMENT & PLAN NOTE
Left hip intramedullary nailing performed 9/29 by orthopedic surgery  Pain management  50% weightbearing left lower extremity  PT/OT evaluation pending  Anticipate discharge to home tomorrow 10/1

## 2020-09-30 PROCEDURE — A9270 NON-COVERED ITEM OR SERVICE: HCPCS | Performed by: STUDENT IN AN ORGANIZED HEALTH CARE EDUCATION/TRAINING PROGRAM

## 2020-09-30 PROCEDURE — 700101 HCHG RX REV CODE 250: Performed by: STUDENT IN AN ORGANIZED HEALTH CARE EDUCATION/TRAINING PROGRAM

## 2020-09-30 PROCEDURE — 700111 HCHG RX REV CODE 636 W/ 250 OVERRIDE (IP): Performed by: ORTHOPAEDIC SURGERY

## 2020-09-30 PROCEDURE — 700105 HCHG RX REV CODE 258: Performed by: STUDENT IN AN ORGANIZED HEALTH CARE EDUCATION/TRAINING PROGRAM

## 2020-09-30 PROCEDURE — 99231 SBSQ HOSP IP/OBS SF/LOW 25: CPT | Performed by: STUDENT IN AN ORGANIZED HEALTH CARE EDUCATION/TRAINING PROGRAM

## 2020-09-30 PROCEDURE — 700102 HCHG RX REV CODE 250 W/ 637 OVERRIDE(OP): Performed by: STUDENT IN AN ORGANIZED HEALTH CARE EDUCATION/TRAINING PROGRAM

## 2020-09-30 PROCEDURE — 97165 OT EVAL LOW COMPLEX 30 MIN: CPT

## 2020-09-30 PROCEDURE — 770006 HCHG ROOM/CARE - MED/SURG/GYN SEMI*

## 2020-09-30 PROCEDURE — 97162 PT EVAL MOD COMPLEX 30 MIN: CPT

## 2020-09-30 RX ADMIN — OXYCODONE 2.5 MG: 5 TABLET ORAL at 17:40

## 2020-09-30 RX ADMIN — OXYCODONE 5 MG: 5 TABLET ORAL at 23:49

## 2020-09-30 RX ADMIN — CEFAZOLIN SODIUM 1 G: 1 INJECTION, SOLUTION INTRAVENOUS at 00:15

## 2020-09-30 RX ADMIN — ACETAMINOPHEN 650 MG: 325 TABLET, FILM COATED ORAL at 07:49

## 2020-09-30 RX ADMIN — POLYETHYLENE GLYCOL 3350 1 PACKET: 17 POWDER, FOR SOLUTION ORAL at 17:40

## 2020-09-30 RX ADMIN — SODIUM CHLORIDE, POTASSIUM CHLORIDE, SODIUM LACTATE AND CALCIUM CHLORIDE: 600; 310; 30; 20 INJECTION, SOLUTION INTRAVENOUS at 03:30

## 2020-09-30 RX ADMIN — DOCUSATE SODIUM 50 MG AND SENNOSIDES 8.6 MG 2 TABLET: 8.6; 5 TABLET, FILM COATED ORAL at 17:40

## 2020-09-30 RX ADMIN — CEFAZOLIN SODIUM 1 G: 1 INJECTION, SOLUTION INTRAVENOUS at 05:23

## 2020-09-30 RX ADMIN — OXYCODONE 5 MG: 5 TABLET ORAL at 09:02

## 2020-09-30 RX ADMIN — ENOXAPARIN SODIUM 40 MG: 40 INJECTION SUBCUTANEOUS at 05:23

## 2020-09-30 RX ADMIN — OXYCODONE 2.5 MG: 5 TABLET ORAL at 12:53

## 2020-09-30 RX ADMIN — CEFAZOLIN SODIUM 1 G: 1 INJECTION, SOLUTION INTRAVENOUS at 13:53

## 2020-09-30 RX ADMIN — ACETAMINOPHEN 650 MG: 325 TABLET, FILM COATED ORAL at 13:58

## 2020-09-30 ASSESSMENT — PAIN DESCRIPTION - PAIN TYPE
TYPE: SURGICAL PAIN

## 2020-09-30 ASSESSMENT — COGNITIVE AND FUNCTIONAL STATUS - GENERAL
SUGGESTED CMS G CODE MODIFIER DAILY ACTIVITY: CJ
MOVING FROM LYING ON BACK TO SITTING ON SIDE OF FLAT BED: A LITTLE
MOVING TO AND FROM BED TO CHAIR: A LOT
DRESSING REGULAR LOWER BODY CLOTHING: A LITTLE
HELP NEEDED FOR BATHING: A LITTLE
STANDING UP FROM CHAIR USING ARMS: A LITTLE
TOILETING: A LITTLE
WALKING IN HOSPITAL ROOM: A LITTLE
MOBILITY SCORE: 17
CLIMB 3 TO 5 STEPS WITH RAILING: A LITTLE
TURNING FROM BACK TO SIDE WHILE IN FLAT BAD: A LITTLE
DAILY ACTIVITIY SCORE: 21
SUGGESTED CMS G CODE MODIFIER MOBILITY: CK

## 2020-09-30 ASSESSMENT — ENCOUNTER SYMPTOMS
PALPITATIONS: 0
NAUSEA: 0
COUGH: 0
INSOMNIA: 0
FALLS: 1
VOMITING: 0
ABDOMINAL PAIN: 0
CHILLS: 0
DIZZINESS: 0
SHORTNESS OF BREATH: 0
BACK PAIN: 0
HEADACHES: 0
FEVER: 0
EYE PAIN: 0
BLURRED VISION: 0

## 2020-09-30 ASSESSMENT — LIFESTYLE VARIABLES: SUBSTANCE_ABUSE: 0

## 2020-09-30 ASSESSMENT — GAIT ASSESSMENTS
ASSISTIVE DEVICE: FRONT WHEEL WALKER
DISTANCE (FEET): 80
DEVIATION: ANTALGIC;STEP TO;DECREASED BASE OF SUPPORT;DECREASED HEEL STRIKE;DECREASED TOE OFF;OTHER (COMMENT)

## 2020-09-30 ASSESSMENT — ACTIVITIES OF DAILY LIVING (ADL): TOILETING: INDEPENDENT

## 2020-09-30 NOTE — THERAPY
Physical Therapy   Initial Evaluation     Patient Name: Diego Giron  Age:  50 y.o., Sex:  male  Medical Record #: [id number]  Today's Date: 9/30/2020     Precautions: (Partial Weight Bearing Left Lower Extremity (See Comments for Percentage)(50%)    Assessment  Pt presents to PT s/p IM nailing after fall with L IT hip fracture from skateboard accident. He is able to demonstrate ambulation with FWW with Spv/SBA and progresses well within visit with re: ROM and functional activity. He is limited 2' to guarding as well as needing cueing for appropriate compensatory strategies with LLE PWB precautions. Will continue to visit with recs/details below.     Plan    Recommend Physical Therapy 3 times per week until therapy goals are met for the following treatments:  Bed Mobility, Community Re-integration, Equipment, Gait Training, Manual Therapy, Neuro Re-Education / Balance, Self Care/Home Evaluation, Stair Training, Therapeutic Activities and Therapeutic Exercises    DC Equipment Recommendations:  Unable to determine at this time(FWW v crutches dep on progression)  Discharge Recommendations:  Recommend outpatient physical therapy services to address higher level deficits(post acute/subacute healing)          09/30/20 1037   Prior Living Situation   Prior Services None   Housing / Facility 1 Story House   Steps Into Home 2   Steps In Home 0   Bathroom Set up Bathtub / Shower Combination   Equipment Owned None   Lives with - Patient's Self Care Capacity Significant Other   Comments reports SO works though will be available to asssit with IADls;    Prior Level of Functional Mobility   Bed Mobility Independent   Transfer Status Independent   Ambulation Independent   Distance Ambulation (Feet)   (community)   Assistive Devices Used None   Stairs Independent   Comments I with IADls and ADLs prior; pt was skateboarding at time of injury   History of Falls   History of Falls Yes   Date of Last Fall   (fall from skateboard;  no other recent falls)   Cognition    Cognition / Consciousness WDL   Level of Consciousness Alert   Comments very pleasant and cooperative; quite receptive to education   Passive ROM Lower Body   Passive ROM Lower Body X   Comments guarded 2' to pain; however grossly WFL    Active ROM Lower Body    Active ROM Lower Body  X   Comments initially required AAROM at LLE knee extension and hip flexion; able to complete by end of visti with re: knee extension; still guarded at hip   Strength Lower Body   Lower Body Strength  X   Comments RLE WFL; LLE hip flexion at 3-/5, knee extension at 3+/5 by end of visit   Sensation Lower Body   Lower Extremity Sensation   WDL   Balance Assessment   Sitting Balance (Static) Fair +   Sitting Balance (Dynamic) Fair +   Standing Balance (Static) Fair   Standing Balance (Dynamic) Fair   Weight Shift Sitting Good   Weight Shift Standing Fair   Comments no robi LOB during ambulation with FWW; cueign and demo for mechanics; improves during visit with practice for compensatorys strategies   Gait Analysis   Gait Level Of Assist   (SBA/CGA with cueing)   Assistive Device Front Wheel Walker   Distance (Feet) 80   # of Times Distance was Traveled 1   Deviation Antalgic;Step To;Decreased Base Of Support;Decreased Heel Strike;Decreased Toe Off;Other (Comment)  (progress to reciprocal gait; guarded)   # of Stairs Climbed 0   Weight Bearing Status PWB LLE 50%   Bed Mobility    Supine to Sit Minimal Assist  (with LLE to come to EOB; guarded)   Sit to Supine   (left in chair)   Comments appears capable with effort; CGA/min Jw rpevent rapid LLE movement when dropping off EOB   Functional Mobility   Sit to Stand   (CGA with VCs )   Bed, Chair, Wheelchair Transfer   (SBA)   Transfer Method Other (Comments)  (walks to chair)   Mobility with FWW   Activity Tolerance   Comments no overt/acute fatigue; tolerates well   Edema / Skin Assessment   Comments incision dressed adn c/d/i   Short Term Goals     Short Term Goal # 1 Pt will be able to perform supine<>sit with HOB flat/no rails with Spv in 6tx to promote fnx progression to I    Short Term Goal # 2 Pt will be able to perform sit<>stand/transfers with B crutches with SPv in 6tx to promote dc to home plan   Short Term Goal # 3 Pt will be able to ambulate 150ft with B crutchs with Spv in 6tx to promote fnx progression to I    Short Term Goal # 4 Pt will be able to ambulate up/down 2 steps with B crutches in 6tx to promote fnx progression to I    Education Group   Gait Training Patient Response Patient;Acceptance;Explanation;Demonstration;Teach Back;Verbal Demonstration;Action Demonstration   Use of Assistive Device Patient Response Patient;Acceptance;Explanation;Demonstration;Teach Back;Verbal Demonstration;Action Demonstration  (see below)   Weight Bearing Status Patient Response Patient;Acceptance;Explanation;Demonstration;Teach Back;Verbal Demonstration;Action Demonstration   Additional Comments education re: progression while here; likely progress to B crutches; education re: possible strategies for stair and dc recs/outpatient post acute/subacute healing

## 2020-09-30 NOTE — THERAPY
"Occupational Therapy   Initial Evaluation     Patient Name: Diego Giron  Age:  50 y.o., Sex:  male  Medical Record #: [id number]  Today's Date: 9/30/2020       Precautions: Partial Weight Bearing Left Lower Extremity (See Comments for Percentage)(50% WB LLE for approx 1 month)  Comments: s/p IM nailing    Assessment  Patient is 50 y.o. male with a diagnosis of GLF while skateboarding.  Pt sustained a left intertrochanteric hip fx.  Pt is s/p left IM nailing.  Pt required Min A to dress LB.  Discussed clothing items that will be easier to don at home.  Pt able to groom standing at the sink with supervision.  Pt did report he has a tub at home & advised he would need a tub transfer bench.  Pt stated he may shower at his SO parents home instead.  Pt educated on his PWB 50% LLE during ADL's.    Plan    Recommend Occupational Therapy 3 times per week until therapy goals are met for the following treatments:  Adaptive Equipment, Neuro Re-Education / Balance, Self Care/Activities of Daily Living, Therapeutic Activities and Therapeutic Exercises.    DC Equipment Recommendations: Tub Transfer Bench  Discharge Recommendations: Anticipate that the patient will have no further occupational therapy needs after discharge from the hospital     Subjective    \"I can't believe how hard this all is\"     Objective       09/30/20 1121   Prior Living Situation   Prior Services None   Housing / Facility 1 Story House   Steps Into Home 2   Bathroom Set up Bathtub / Shower Combination   Equipment Owned None   Lives with - Patient's Self Care Capacity Significant Other   Comments Pt reports his SO is available to assist as needed   ADL Assessment   Eating Modified Independent   Grooming Supervision;Standing   Upper Body Dressing Supervision   Lower Body Dressing Minimal Assist   Toileting Supervision   Comments discussed clothing items that would be easier to wear at home   Functional Mobility   Sit to Stand Supervised   Bed, Chair, " Wheelchair Transfer Supervised   Toilet Transfers Supervised  (using grab bar)   Patient / Family Goals   Patient / Family Goal #1 To be more independent   Short Term Goals   Short Term Goal # 1 Pt will dress LB with AE & supervision   Short Term Goal # 2 Pt will maintain 50% WB LLE during ADL's   Short Term Goal # 3 Pt will amb with FWW to bathroom with supervision

## 2020-09-30 NOTE — PROGRESS NOTES
[facility] Medicine Daily Progress Note    Date of Service  9/30/2020    Chief Complaint  50 y.o. male admitted 9/28/2020 with hip pain after ground level fall.    Hospital Course    50 y.o. male with no past medical history who presented following an accident on a skateboard this evening.  Patient is very active and a cyclist, and has been unable to bear weight since falling a few hours ago.  Xray performed in the ER show Impacted left intertrochanteric femoral neck fracture. Orthopedics is consulted and patient is admitted for pain management and treatment as recommended      Interval Problem Update  No acute events overnight.  Underwent left hip intramedullary nailing yesterday 9/29.  Patient feels well today, looking forward to working with physical therapy.      Consultants/Specialty  Orthopedic surgery    Code Status  Full Code    Disposition  Inpatient, likely discharge to home tomorrow 10/1.    Review of Systems  Review of Systems   Constitutional: Negative for chills and fever.   Eyes: Negative for blurred vision and pain.   Respiratory: Negative for cough and shortness of breath.    Cardiovascular: Negative for chest pain, palpitations and leg swelling.   Gastrointestinal: Negative for abdominal pain, nausea and vomiting.   Genitourinary: Negative for dysuria and urgency.   Musculoskeletal: Positive for falls and joint pain. Negative for back pain.   Skin: Negative for itching and rash.   Neurological: Negative for dizziness and headaches.   Psychiatric/Behavioral: Negative for substance abuse. The patient does not have insomnia.         Physical Exam  Temp:  [35.9 °C (96.7 °F)-37 °C (98.6 °F)] 37 °C (98.6 °F)  Pulse:  [72-92] 82  Resp:  [9-21] 16  BP: ()/(52-75) 106/69  SpO2:  [95 %-100 %] 97 %    Physical Exam  Constitutional:       General: He is not in acute distress.     Appearance: He is not ill-appearing.   HENT:      Head: Normocephalic and atraumatic.      Right Ear: External ear normal.       Left Ear: External ear normal.      Mouth/Throat:      Pharynx: No oropharyngeal exudate or posterior oropharyngeal erythema.   Eyes:      Extraocular Movements: Extraocular movements intact.      Pupils: Pupils are equal, round, and reactive to light.   Neck:      Musculoskeletal: Normal range of motion and neck supple.   Cardiovascular:      Rate and Rhythm: Normal rate and regular rhythm.      Pulses: Normal pulses.      Heart sounds: Normal heart sounds.   Pulmonary:      Effort: Pulmonary effort is normal.      Breath sounds: Normal breath sounds.   Abdominal:      General: Bowel sounds are normal.      Palpations: Abdomen is soft.   Musculoskeletal:         General: No swelling or tenderness.      Right lower leg: No edema.      Left lower leg: No edema.      Comments: Pain with left hip rotation   Skin:     General: Skin is warm and dry.   Neurological:      General: No focal deficit present.      Mental Status: He is oriented to person, place, and time.   Psychiatric:         Mood and Affect: Mood normal.         Behavior: Behavior normal.         Fluids    Intake/Output Summary (Last 24 hours) at 9/30/2020 1119  Last data filed at 9/30/2020 0902  Gross per 24 hour   Intake 1140 ml   Output 1575 ml   Net -435 ml       Laboratory  Recent Labs     09/29/20  0342   WBC 14.1*   RBC 4.54*   HEMOGLOBIN 13.8*   HEMATOCRIT 41.1*   MCV 90.5   MCH 30.4   MCHC 33.6*   RDW 41.8   PLATELETCT 255   MPV 10.3     Recent Labs     09/29/20  0342   SODIUM 137   POTASSIUM 4.0   CHLORIDE 100   CO2 22   GLUCOSE 117*   BUN 16   CREATININE 0.52   CALCIUM 8.8     Recent Labs     09/29/20  0342   INR 1.07         Recent Labs     09/29/20  0342   TRIGLYCERIDE 28   HDL 47   LDL 72       Imaging  DX-PORTABLE FLUOROSCOPY < 1 HOUR   Preliminary Result      Portable fluoroscopy utilized for 18 seconds.         INTERPRETING LOCATION: [address] [place] [address], [address]      DX-HIP-UNILATERAL-W/O PELVIS-2/3 VIEWS LEFT   Final Result       Intraoperative image as above described.      DX-HIP-COMPLETE - UNILATERAL 2+ LEFT   Final Result         1.  Impacted left intertrochanteric femoral neck fracture.   2.  Sclerotic focus in the left femoral neck, commonly associated with bony island, consider other sclerotic bony lesion with additional workup as clinically appropriate.           Assessment/Plan  Closed fracture of neck of left femur (HCC)- (present on admission)  Assessment & Plan  Surgical fixation today 9/29 per ortho  Pain management  NPO  Nonweightbearing left lower extremity  PT/OT evaluation after surgery       VTE prophylaxis: lovenox

## 2020-09-30 NOTE — PROGRESS NOTES
Pt arrived back to unit from PACU. Pt AAOx4, VSS, denies chest pain and SOB. Assessment complete. Surgical dressing x2 to L thigh CD&I. Pt rating pain 3/10, tolerable per pt. Bed locked and in lowest position. Call light and belongings within reach. Pt updated on POC.

## 2020-09-30 NOTE — CARE PLAN
Problem: Pain Management  Goal: Pain level will decrease to patient's comfort goal  Outcome: PROGRESSING AS EXPECTED     Problem: Communication  Goal: The ability to communicate needs accurately and effectively will improve  Outcome: PROGRESSING AS EXPECTED     Problem: Safety  Goal: Will remain free from injury  Outcome: PROGRESSING AS EXPECTED     Problem: Safety  Goal: Will remain free from falls  Outcome: PROGRESSING AS EXPECTED

## 2020-09-30 NOTE — PROGRESS NOTES
"   Orthopaedic PA Progress Note    Interval changes:Cleared for DC from Ortho standpoint, await clearance from therapy    ROS - Patient denies any new issues. No chest pain, dyspnea, or fever.  Pain well controlled.    BP (!) 97/62   Pulse 75   Temp 36.1 °C (97 °F) (Temporal)   Resp 16   Ht 1.753 m (5' 9\")   Wt 62.7 kg (138 lb 3.7 oz)   SpO2 97%     Patient seen and examined  No acute distress  Breathing non labored  RRR  Surgical dressing is clean, dry, and intact. Patient clearly fires tibialis anterior, EHL, and gastrocnemius/soleus. Sensation is intact to light touch throughout superficial peroneal, deep peroneal, tibial, saphenous, and sural nerve distributions. Strong and palpable 2+ dorsalis pedis and posterior tibial pulses with capillary refill less than 2 seconds. No lower leg tenderness or discomfort.    Recent Labs     09/29/20  0342   WBC 14.1*   RBC 4.54*   HEMOGLOBIN 13.8*   HEMATOCRIT 41.1*   MCV 90.5   MCH 30.4   MCHC 33.6*   RDW 41.8   PLATELETCT 255   MPV 10.3       Active Hospital Problems    Diagnosis   • Closed fracture of neck of left femur (HCC) [S72.002A]       Assessment/Plan:  POD#1 S/P IMN L hip  Wt bearing status - 50% LLE  PT/OT-initiated  Wound care:dressing left in place  Drains - no  Diggs-no  Sutures/Staples out- 10-14 days post operatively  Antibiotics: complete  DVT Prophylaxis- TEDS/SCDs/Foot pumps.   Lovenox: Start 40 mg daily, Duration-until ambulatory > 150'  Transition to  mg PO BID on discharge x 4 wks  Future Procedures - none planned  Case Coordination for Discharge Planning - Disposition Followup 10/12 Dr. Driver at [facility]      "

## 2020-09-30 NOTE — CARE PLAN
Problem: Pain Management  Goal: Pain level will decrease to patient's comfort goal  Outcome: PROGRESSING AS EXPECTED  Note: Pain medication given per MAR. Pt educated on non-pharmacological pain control interventions. Ice pack provided. Pain well controlled on oral pain medication.     Problem: Communication  Goal: The ability to communicate needs accurately and effectively will improve  Outcome: PROGRESSING AS EXPECTED  Note: Pt updated on POC. All questions answered. Pt uses call light appropriately to communicate needs.     Problem: Venous Thromboembolism (VTW)/Deep Vein Thrombosis (DVT) Prevention:  Goal: Patient will participate in Venous Thrombosis (VTE)/Deep Vein Thrombosis (DVT)Prevention Measures  Outcome: PROGRESSING AS EXPECTED  Note: SCDs on and in use.

## 2020-09-30 NOTE — PROGRESS NOTES
2 RN Skin Check    2 RN skin check completed with NITESH Schmidt.   Devices in place: SCDs.  Skin assessed under devices: yes.  Confirmed pressure ulcers found on: none.  New potential pressure ulcers noted on none. Wound consult placed No.  The following interventions in place Pillows.    Skin overall intact. Multiple abrasions to L elbow. Surgical dressing x2 to L thigh CD&I. Pillows in use for support.

## 2020-10-01 VITALS
WEIGHT: 138.23 LBS | BODY MASS INDEX: 20.47 KG/M2 | DIASTOLIC BLOOD PRESSURE: 75 MMHG | OXYGEN SATURATION: 96 % | RESPIRATION RATE: 16 BRPM | HEIGHT: 69 IN | HEART RATE: 76 BPM | SYSTOLIC BLOOD PRESSURE: 113 MMHG | TEMPERATURE: 97.5 F

## 2020-10-01 LAB
ANION GAP SERPL CALC-SCNC: 8 MMOL/L (ref 7–16)
BUN SERPL-MCNC: 8 MG/DL (ref 8–22)
CALCIUM SERPL-MCNC: 8.5 MG/DL (ref 8.5–10.5)
CHLORIDE SERPL-SCNC: 100 MMOL/L (ref 96–112)
CO2 SERPL-SCNC: 27 MMOL/L (ref 20–33)
CREAT SERPL-MCNC: 0.5 MG/DL (ref 0.5–1.4)
ERYTHROCYTE [DISTWIDTH] IN BLOOD BY AUTOMATED COUNT: 42.8 FL (ref 35.9–50)
GLUCOSE SERPL-MCNC: 109 MG/DL (ref 65–99)
HCT VFR BLD AUTO: 33.2 % (ref 42–52)
HGB BLD-MCNC: 11.2 G/DL (ref 14–18)
MCH RBC QN AUTO: 30.6 PG (ref 27–33)
MCHC RBC AUTO-ENTMCNC: 33.3 G/DL (ref 33.7–35.3)
MCV RBC AUTO: 91.9 FL (ref 81.4–97.8)
PLATELET # BLD AUTO: 177 K/UL (ref 164–446)
PMV BLD AUTO: 10.7 FL (ref 9–12.9)
POTASSIUM SERPL-SCNC: 3.8 MMOL/L (ref 3.6–5.5)
RBC # BLD AUTO: 3.59 M/UL (ref 4.7–6.1)
SODIUM SERPL-SCNC: 135 MMOL/L (ref 135–145)
WBC # BLD AUTO: 11.3 K/UL (ref 4.8–10.8)

## 2020-10-01 PROCEDURE — 97530 THERAPEUTIC ACTIVITIES: CPT

## 2020-10-01 PROCEDURE — 97116 GAIT TRAINING THERAPY: CPT

## 2020-10-01 PROCEDURE — 700102 HCHG RX REV CODE 250 W/ 637 OVERRIDE(OP): Performed by: STUDENT IN AN ORGANIZED HEALTH CARE EDUCATION/TRAINING PROGRAM

## 2020-10-01 PROCEDURE — 36415 COLL VENOUS BLD VENIPUNCTURE: CPT

## 2020-10-01 PROCEDURE — 80048 BASIC METABOLIC PNL TOTAL CA: CPT

## 2020-10-01 PROCEDURE — A9270 NON-COVERED ITEM OR SERVICE: HCPCS | Performed by: STUDENT IN AN ORGANIZED HEALTH CARE EDUCATION/TRAINING PROGRAM

## 2020-10-01 PROCEDURE — 99239 HOSP IP/OBS DSCHRG MGMT >30: CPT | Performed by: STUDENT IN AN ORGANIZED HEALTH CARE EDUCATION/TRAINING PROGRAM

## 2020-10-01 PROCEDURE — 85027 COMPLETE CBC AUTOMATED: CPT

## 2020-10-01 PROCEDURE — 700111 HCHG RX REV CODE 636 W/ 250 OVERRIDE (IP): Performed by: ORTHOPAEDIC SURGERY

## 2020-10-01 RX ORDER — OXYCODONE HYDROCHLORIDE 5 MG/1
5 TABLET ORAL EVERY 6 HOURS PRN
Qty: 28 TAB | Refills: 0 | Status: SHIPPED | OUTPATIENT
Start: 2020-10-01 | End: 2020-10-08

## 2020-10-01 RX ADMIN — ACETAMINOPHEN 650 MG: 325 TABLET, FILM COATED ORAL at 05:01

## 2020-10-01 RX ADMIN — ENOXAPARIN SODIUM 40 MG: 40 INJECTION SUBCUTANEOUS at 05:01

## 2020-10-01 RX ADMIN — DOCUSATE SODIUM 50 MG AND SENNOSIDES 8.6 MG 2 TABLET: 8.6; 5 TABLET, FILM COATED ORAL at 05:01

## 2020-10-01 RX ADMIN — OXYCODONE 5 MG: 5 TABLET ORAL at 08:50

## 2020-10-01 ASSESSMENT — GAIT ASSESSMENTS
DISTANCE (FEET): 100
GAIT LEVEL OF ASSIST: SUPERVISED
ASSISTIVE DEVICE: CRUTCHES

## 2020-10-01 ASSESSMENT — COGNITIVE AND FUNCTIONAL STATUS - GENERAL
CLIMB 3 TO 5 STEPS WITH RAILING: A LITTLE
TURNING FROM BACK TO SIDE WHILE IN FLAT BAD: A LITTLE
MOVING TO AND FROM BED TO CHAIR: A LITTLE
SUGGESTED CMS G CODE MODIFIER MOBILITY: CJ
MOVING FROM LYING ON BACK TO SITTING ON SIDE OF FLAT BED: A LITTLE
MOBILITY SCORE: 20

## 2020-10-01 ASSESSMENT — PAIN DESCRIPTION - PAIN TYPE
TYPE: SURGICAL PAIN

## 2020-10-01 NOTE — DISCHARGE SUMMARY
Discharge Summary    CHIEF COMPLAINT ON ADMISSION  Chief Complaint   Patient presents with   • T-5000 FALL     skateboarding and fell   • Hip Pain     can't bear weight.       Reason for Admission  T-5000; Hip Pain     Admission Date  9/28/2020    CODE STATUS  Full Code    HPI & HOSPITAL COURSE    50 y.o. male with no past medical history who presented following an accident on a skateboard this evening.  Patient is very active and a cyclist, and has been unable to bear weight since falling a few hours ago.  Xray performed in the ER show Impacted left intertrochanteric femoral neck fracture. Orthopedics is consulted and patient is admitted for pain management and treatment as recommended    Patient underwent intramedullary nailing of left hip without complication. He was evaluated by physical therapy who recommend crutches for mobility, and continued outpatient therapy services. Outpatient physical therapy referral made. Patient is to follow up with orthopedic surgery 10/12 with Dr Driver at [facility].    Therefore, he is discharged in good and stable condition to home with close outpatient follow-up.    The patient met 2-midnight criteria for an inpatient stay at the time of discharge.    Discharge Date  10/1/2020    FOLLOW UP ITEMS POST DISCHARGE  Follow up with orthopedic surgery on 10/12.  Take aspirin 81 mg BID until seen by orthopedic surgery    DISCHARGE DIAGNOSES  Active Problems:    Closed fracture of neck of left femur (HCC) POA: Yes  Resolved Problems:    * No resolved hospital problems. *      FOLLOW UP  No future appointments.  Akash Driver M.D.  [address]  [place] [address]  [phone]    In 11 days  10/12      MEDICATIONS ON DISCHARGE     Medication List      START taking these medications      Instructions   aspirin EC 81 MG Tbec  Commonly known as: ECOTRIN   Take 1 Tab by mouth 2 Times a Day.  Dose: 81 mg     oxyCODONE immediate-release 5 MG Tabs  Commonly known as: ROXICODONE   Take 1 Tab by  mouth every 6 hours as needed for Severe Pain for up to 7 days.  Dose: 5 mg            Allergies  No Known Allergies    DIET  Orders Placed This Encounter   Procedures   • Diet Order Regular (VEGAN VEGAN)     Standing Status:   Standing     Number of Occurrences:   1     Order Specific Question:   Diet:     Answer:   Regular [1]     Comments:   VEGAN VEGAN     Order Specific Question:   Miscellaneous modifications:     Answer:   Vegetarian [13]       ACTIVITY  As tolerated.  50% weight bearing on left leg    CONSULTATIONS  Orthopedic surgery    PROCEDURES  9/29: intramedullary nailing, left hip    LABORATORY  Lab Results   Component Value Date    SODIUM 135 10/01/2020    POTASSIUM 3.8 10/01/2020    CHLORIDE 100 10/01/2020    CO2 27 10/01/2020    GLUCOSE 109 (H) 10/01/2020    BUN 8 10/01/2020    CREATININE 0.50 10/01/2020        Lab Results   Component Value Date    WBC 11.3 (H) 10/01/2020    HEMOGLOBIN 11.2 (L) 10/01/2020    HEMATOCRIT 33.2 (L) 10/01/2020    PLATELETCT 177 10/01/2020        Total time of the discharge process exceeds 40 minutes.

## 2020-10-01 NOTE — THERAPY
Physical Therapy   Daily Treatment     Patient Name: Diego Giron  Age:  50 y.o., Sex:  male  Medical Record #: [id number]  Today's Date: 10/1/2020     Precautions:  Partial Weight Bearing Left Lower Extremity (See Comments for Percentage)(50%WB LLE)    Assessment    Pt progressing as expected. He was able to demonstrate ambulation with B crutches as well as several stairs with B crutches. He demonstrates appropriate carryover with re: mechanics and compensatory strategies and progresses well within visit. WIll continue to visit for optimal progression/mechanics as remains in hospital though pt appears functionally capable of dc to home once medically cleared. See below for details/recs.     Plan    Continue current treatment plan.    DC Equipment Recommendations:  Crutches  Discharge Recommendations:  Recommend outpatient physical therapy services to address higher level deficits(post acute/subacute healing)       10/01/20 1010   Sitting Lower Body Exercises   Sitting Lower Body Exercises Yes   Comments education  and trial of LAQs and hip flexion in seated position with AAROM to initiate/progress to AROM (education re: use of compensatory strategies/caregiver to assist initialy)   Balance   Sitting Balance (Static) Good   Sitting Balance (Dynamic) Fair +   Standing Balance (Static) Fair   Standing Balance (Dynamic) Fair   Weight Shift Sitting Good   Weight Shift Standing Fair   Skilled Intervention Verbal Cuing;Compensatory Strategies;Postural Facilitation;Sequencing;Tactile Cuing   Comments no robi LOB during ambulation with B crutches; demo/cueing and practice with mechanics and progression   Gait Analysis   Gait Level Of Assist Supervised   Assistive Device Crutches   Distance (Feet) 100   # of Times Distance was Traveled 1  (multiple rest breaks 2' to adjusting crutches/for demo )   Deviation Step To;Other (Comment);Decreased Heel Strike;Decreased Toe Off  (see below)   # of Stairs Climbed  8  (total;varying sizes and progression from B rails to B crutch)   Level of Assist with Stairs   (CGA/min A progressing to SBA/Spv)   Weight Bearing Status PWB LLE 50%   Skilled Intervention Verbal Cuing;Tactile Cuing;Sequencing;Postural Facilitation;Compensatory Strategies;Facilitation   Comments see balance; cueing and facilitation of mechanics; progresses well within visit; aware of recs/mecahnics and pt reports will have SO assist as needed as well   Bed Mobility    Comments found in  chair   Functional Mobility   Sit to Stand Supervised   Bed, Chair, Wheelchair Transfer Supervised   Transfer Method Other (Comments)  (walks to chair)   Mobility with B crutches   Skilled Intervention Verbal Cuing;Tactile Cuing;Sequencing;Postural Facilitation;Compensatory Strategies;Facilitation   How much difficulty does the patient currently have...   Turning over in bed (including adjusting bedclothes, sheets and blankets)? 3   Sitting down on and standing up from a chair with arms (e.g., wheelchair, bedside commode, etc.) 3   Moving from lying on back to sitting on the side of the bed? 3   How much help from another person does the patient currently need...   Moving to and from a bed to a chair (including a wheelchair)? 4   Need to walk in a hospital room? 4   Climbing 3-5 steps with a railing? 3   6 clicks Mobility Score 20   Activity Tolerance   Sitting in Chair functional   Sitting Edge of Bed NT   Standing at least 13-14 mins total   Comments no overt/acute fatigue; pain more limiting this visti than prior though is manageable   Patient / Family Goals    Patient / Family Goal #1 to return to PLOF   Goal #1 Outcome Progressing as expected   Short Term Goals    Short Term Goal # 1 Pt will be able to perform supine<>sit with HOB flat/no rails with Spv in 6tx to promote fnx progression to I    Goal Outcome # 1 Progressing as expected   Short Term Goal # 2 Pt will be able to perform sit<>stand/transfers with B crutches with  SPv in 6tx to promote dc to home plan   Goal Outcome # 2 Goal met   Short Term Goal # 3 Pt will be able to ambulate 150ft with B crutchs with Spv in 6tx to promote fnx progression to I    Goal Outcome # 3 Progressing as expected   Short Term Goal # 4 Pt will be able to ambulate up/down 2 steps with B crutches in 6tx to promote fnx progression to I    Goal Outcome # 4 Progressing as expected   Education Group   Education Provided Gait Training;Use of Assistive Device;Stair Training   Role of Physical Therapist Patient Response Patient;Acceptance;Explanation;Verbal Demonstration   Gait Training Patient Response Patient;Acceptance;Explanation;Demonstration;Teach Back;Verbal Demonstration;Action Demonstration   Stair Training Patient Response Patient;Acceptance;Explanation;Demonstration;Teach Back;Verbal Demonstration;Action Demonstration   Use of Assistive Device Patient Response Patient;Acceptance;Explanation;Demonstration;Teach Back;Verbal Demonstration;Action Demonstration   Weight Bearing Status Patient Response Patient;Acceptance;Explanation;Demonstration;Teach Back;Verbal Demonstration;Action Demonstration   Additional Comments education re: progression, activity recs, use of B crutches

## 2020-10-01 NOTE — DISCHARGE PLANNING
Anticipated Discharge Disposition: home with crutches    Action: Pt needs crutches for home, discussed providers, pt chose [facility]. Bedside RN will contact traction. No other needs identified.    Barriers to Discharge: none    Plan: home with crutches.

## 2020-10-01 NOTE — PROGRESS NOTES
DATE OF SERVICE:    TIME:  Approximately 12:30 p.m.    SUBJECTIVE:  The patient is sitting in a chair.  He feels fine today.  He does   have some pain, but overall is feeling better.    OBJECTIVE:  VITAL SIGNS:  Blood pressure 97/62, heart rate 75, respirations 16,   temperature 97.  EXTREMITIES:  He is able to dorsiflex and plantarflex his toes.  Dressing is   dry.    LABORATORY DATA:  No labs.    ASSESSMENT:  Left intertrochanteric hip fracture -- status post intramedullary   nailing.    PLAN:  Mobilize with therapy.  A 50% partial weightbearing for 4 weeks.  DVT   prophylaxis while inpatient with SCDs and Lovenox.  Aspirin 81 mg twice a day   after discharge for 4 weeks.  Anticipate discharge home tomorrow.       ____________________________________     MD JUVE OBRIEN / JOSE    DD:  09/30/2020 13:59:21  DT:  09/30/2020 23:57:45    D#:  [id number]  Job#:  [id number]

## 2020-10-01 NOTE — PROGRESS NOTES
Pt being dc'd to home with crutches.The following prescriptions given none; escripted. IV dc'd. Dc instructions discussed with patient. Flu shot  refused. All questions answered.  Patient agreeable to dc plan. [facility] Bedside RN to confirm that patient has all belongings at dc and that all home care needs have been arranged. Pt waiting on crutches to be delivered. Ride waiting. Incision care dressing supplies given.

## 2020-10-01 NOTE — PROGRESS NOTES
"   Orthopaedic PA Progress Note    Interval changes:Cleared for DC from Ortho standpoint, await clearance from therapy    ROS - Patient denies any new issues. No chest pain, dyspnea, or fever.  Pain well controlled.    /75   Pulse 76   Temp 36.4 °C (97.5 °F) (Temporal)   Resp 16   Ht 1.753 m (5' 9\")   Wt 62.7 kg (138 lb 3.7 oz)   SpO2 96%     Patient seen and examined  No acute distress  Breathing non labored  RRR  Surgical dressing is clean, dry, and intact. Patient clearly fires tibialis anterior, EHL, and gastrocnemius/soleus. Sensation is intact to light touch throughout superficial peroneal, deep peroneal, tibial, saphenous, and sural nerve distributions. Strong and palpable 2+ dorsalis pedis and posterior tibial pulses with capillary refill less than 2 seconds. No lower leg tenderness or discomfort.    Recent Labs     09/29/20  0342 10/01/20  0433   WBC 14.1* 11.3*   RBC 4.54* 3.59*   HEMOGLOBIN 13.8* 11.2*   HEMATOCRIT 41.1* 33.2*   MCV 90.5 91.9   MCH 30.4 30.6   MCHC 33.6* 33.3*   RDW 41.8 42.8   PLATELETCT 255 177   MPV 10.3 10.7       Active Hospital Problems    Diagnosis   • Closed fracture of neck of left femur (HCC) [S72.002A]       Assessment/Plan:  POD#2 S/P IMN L hip  Wt bearing status - 50% LLE  PT/OT-initiated  Wound care:dressing left in place  Drains - no  Diggs-no  Sutures/Staples out- 10-14 days post operatively  Antibiotics: complete  DVT Prophylaxis- TEDS/SCDs/Foot pumps.   Lovenox: Start 40 mg daily, Duration-until ambulatory > 150'  Transition to  mg PO BID on discharge x 4 wks  Future Procedures - none planned  Case Coordination for Discharge Planning - Disposition Followup 10/12 Dr. Driver at [facility]      " mid

## 2020-10-01 NOTE — DISCHARGE INSTRUCTIONS
Discharge Instructions    Discharged to home by car with relative. Discharged via wheelchair, hospital escort: Yes.  Special equipment needed: Crutches    Be sure to schedule a follow-up appointment with your primary care doctor or any specialists as instructed.     Discharge Plan:   Influenza Vaccine Indication: Not indicated: Previously immunized this influenza season and > 8 years of age    I understand that a diet low in cholesterol, fat, and sodium is recommended for good health. Unless I have been given specific instructions below for another diet, I accept this instruction as my diet prescription.   Other diet: regular vegan    Special Instructions: Discharge instructions for the Orthopedic Patient    Follow up with Primary Care Physician within 2 weeks of discharge to home, regarding:  Review of medications and diagnostic testing.  Surveillance for medical complications.  Workup and treatment of osteoporosis, if appropriate.     -Is this a Hip/Knee/Shoulder Joint Replacement patient? No    -Is this patient being discharged with medication to prevent blood clots?    50% partial weightbearing for 4 weeks  Yes, Aspirin Aspirin, ASA oral tablets  What is this medicine?  ASPIRIN (AS pir in) is a pain reliever. It is used to treat mild pain and fever. This medicine is also used as directed by a doctor to prevent and to treat heart attacks, to prevent strokes and blood clots, and to treat arthritis or inflammation.  This medicine may be used for other purposes; ask your health care provider or pharmacist if you have questions.  COMMON BRAND NAME(S): Aspir-Low, Aspir-Jud, Aspirtab, Renetta Advanced Aspirin, Renetta Aspirin, Renetta Aspirin Extra Strength, Renetta Aspirin Plus, Renetta Extra Strength, Renetta Extra Strength Plus, Renetta Genuine Aspirin, Renetta Womens Aspirin, Bufferin, Bufferin Extra Strength, Bufferin Low Dose  What should I tell my health care provider before I take this medicine?  They need to know if you have  any of these conditions:  · anemia  · asthma  · bleeding problems  · child with chickenpox, the flu, or other viral infection  · diabetes  · gout  · if you frequently drink alcohol containing drinks  · kidney disease  · liver disease  · low level of vitamin K  · lupus  · smoke tobacco  · stomach ulcers or other problems  · an unusual or allergic reaction to aspirin, tartrazine dye, other medicines, dyes, or preservatives  · pregnant or trying to get pregnant  · breast-feeding  How should I use this medicine?  Take this medicine by mouth with a glass of water. Follow the directions on the package or prescription label. You can take this medicine with or without food. If it upsets your stomach, take it with food. Do not take your medicine more often than directed.  Talk to your pediatrician regarding the use of this medicine in children. While this drug may be prescribed for children as young as 12 years of age for selected conditions, precautions do apply. Children and teenagers should not use this medicine to treat chicken pox or flu symptoms unless directed by a doctor.  Patients over 65 years old may have a stronger reaction and need a smaller dose.  Overdosage: If you think you have taken too much of this medicine contact a poison control center or emergency room at once.  NOTE: This medicine is only for you. Do not share this medicine with others.  What if I miss a dose?  If you are taking this medicine on a regular schedule and miss a dose, take it as soon as you can. If it is almost time for your next dose, take only that dose. Do not take double or extra doses.  What may interact with this medicine?  Do not take this medicine with any of the following medications:  · cidofovir  · ketorolac  · probenecid  This medicine may also interact with the following medications:  · alcohol  · alendronate  · bismuth subsalicylate  · flavocoxid  · herbal supplements like feverfew, garlic, sam, ginkgo biloba, horse  chestnut  · medicines for diabetes or glaucoma like acetazolamide, methazolamide  · medicines for gout  · medicines that treat or prevent blood clots like enoxaparin, heparin, ticlopidine, warfarin  · other aspirin and aspirin-like medicines  · NSAIDs, medicines for pain and inflammation, like ibuprofen or naproxen  · pemetrexed  · sulfinpyrazone  · varicella live vaccine  This list may not describe all possible interactions. Give your health care provider a list of all the medicines, herbs, non-prescription drugs, or dietary supplements you use. Also tell them if you smoke, drink alcohol, or use illegal drugs. Some items may interact with your medicine.  What should I watch for while using this medicine?  If you are treating yourself for pain, tell your doctor or health care professional if the pain lasts more than 10 days, if it gets worse, or if there is a new or different kind of pain. Tell your doctor if you see redness or swelling. Also, check with your doctor if you have a fever that lasts for more than 3 days. Only take this medicine to prevent heart attacks or blood clotting if prescribed by your doctor or health care professional.  Do not take aspirin or aspirin-like medicines with this medicine. Too much aspirin can be dangerous. Always read the labels carefully.  This medicine can irritate your stomach or cause bleeding problems. Do not smoke cigarettes or drink alcohol while taking this medicine. Do not lie down for 30 minutes after taking this medicine to prevent irritation to your throat.  If you are scheduled for any medical or dental procedure, tell your healthcare provider that you are taking this medicine. You may need to stop taking this medicine before the procedure.  This medicine may be used to treat migraines. If you take migraine medicines for 10 or more days a month, your migraines may get worse. Keep a diary of headache days and medicine use. Contact your healthcare professional if your  migraine attacks occur more frequently.  What side effects may I notice from receiving this medicine?  Side effects that you should report to your doctor or health care professional as soon as possible:  · allergic reactions like skin rash, itching or hives, swelling of the face, lips, or tongue  · breathing problems  · changes in hearing, ringing in the ears  · confusion  · general ill feeling or flu-like symptoms  · pain on swallowing  · redness, blistering, peeling or loosening of the skin, including inside the mouth or nose  · signs and symptoms of bleeding such as bloody or black, tarry stools; red or dark-brown urine; spitting up blood or brown material that looks like coffee grounds; red spots on the skin; unusual bruising or bleeding from the eye, gums, or nose  · trouble passing urine or change in the amount of urine  · unusually weak or tired  · yellowing of the eyes or skin  Side effects that usually do not require medical attention (report to your doctor or health care professional if they continue or are bothersome):  · diarrhea or constipation  · headache  · nausea, vomiting  · stomach gas, heartburn  This list may not describe all possible side effects. Call your doctor for medical advice about side effects. You may report side effects to FDA at 5-389-FDA-8186.  Where should I keep my medicine?  Keep out of the reach of children.  Store at room temperature between 15 and 30 degrees C (59 and 86 degrees F). Protect from heat and moisture. Do not use this medicine if it has a strong vinegar smell. Throw away any unused medicine after the expiration date.  NOTE: This sheet is a summary. It may not cover all possible information. If you have questions about this medicine, talk to your doctor, pharmacist, or health care provider.  © 2020 Elsevier/Gold Standard (2018-01-30 10:42:13)      · Is patient discharged on Warfarin / Coumadin?   No       Intramedullary Nailing of Hip Fracture, Care After  This sheet  gives you information about how to care for yourself after your procedure. Your health care provider may also give you more specific instructions. If you have problems or questions, contact your health care provider.  What can I expect after the surgery?  After the procedure, it is common to have these symptoms in your hip area:  · Pain.  · Swelling.  · Tenderness.  · Stiffness and weakness.  Follow these instructions at home:  Medicines  · Take over-the-counter and prescription medicines only as told by your health care provider.  · Ask your health care provider if the medicine prescribed to you:  ? Requires you to avoid driving or using heavy machinery.  ? Can cause constipation. You may need to take actions to prevent or treat constipation, such as:  § Drink enough fluid to keep your urine pale yellow.  § Take over-the-counter or prescription medicines.  § Eat foods that are high in fiber, such as beans, whole grains, and fresh fruits and vegetables.  § Limit foods that are high in fat and processed sugars, such as fried or sweet foods.  Bathing  · Do not take baths, swim, or use a hot tub until your health care provider approves. Ask your health care provider if you may take showers. You may only be allowed to take sponge baths.  · Keep your bandage (dressing) dry if you shower or take a sponge bath.  Incision care    · Follow instructions from your health care provider about how to take care of your incision. Make sure you:  ? Wash your hands with soap and water before and after you change your dressing. If soap and water are not available, use hand .  ? Change your dressing as told by your health care provider.  ? Leave stitches (sutures), skin glue, or adhesive strips in place. These skin closures may need to stay in place for 2 weeks or longer. If adhesive strip edges start to loosen and curl up, you may trim the loose edges. Do not remove adhesive strips completely unless your health care provider  tells you to do that.  · Check your incision area every day for signs of infection. Check for:  ? More redness, swelling, or pain.  ? Fluid or blood.  ? Warmth.  ? Pus or a bad smell.  Managing pain, stiffness, and swelling    · If directed, put ice on the affected area.  ? Put ice in a plastic bag.  ? Place a towel between your skin and the bag.  ? Leave the ice on for 20 minutes, 2-3 times a day.  · Move your toes often to reduce stiffness and swelling.  · Raise (elevate) the injured area above the level of your heart while you are lying down.  Activity  · Rest as told by your health care provider.  · Use your crutches or walker as told by your health care provider. Your health care provider will let you know how much weight you can put on your leg. Your health care provider will do X-rays to check bone healing. As healing progresses, you may be allowed to put more weight on your leg.  · Avoid sitting for a long time without moving. Get up to take short walks every 1-2 hours. This is important to improve blood flow and breathing. Ask for help if you feel weak or unsteady.  · Keep all your physical therapy appointments.  · Do exercises as told by your health care provider. These exercises will prevent weakness and stiffness in your hip.  · Return to your normal activities as told by your health care provider. Ask your health care provider what activities are safe for you. It may take several months to heal completely.  · Ask your health care provider when it is safe to drive.  General instructions  · Do not use any products that contain nicotine or tobacco, such as cigarettes, e-cigarettes, and chewing tobacco. These can delay bone healing after surgery. If you need help quitting, ask your health care provider.  · Take steps to prevent falls at home, such as removing throw rugs and tripping hazards.  · Keep all follow-up visits as told by your health care provider. This is important.  Contact a health care provider  if:  · You are not getting relief from your pain medicine.  · You have more redness, swelling, or pain around your incision.  · You have fluid or blood coming from your incision.  · Your incision feels warm to the touch.  · You have pus or a bad smell coming from your incision.  Get help right away if:  · You have a fever and chills.  · You have chest pain or trouble breathing.  · Your incision breaks open.  · You have severe pain that does not get better with medicine.  Summary  · After your surgery, it is normal to have some pain, swelling, and tenderness.  · Take over-the-counter and prescription medicines only as told by your health care provider.  · Follow instructions from your health care provider about how to take care of your incision. Check your incision area every day for signs of infection.  · Return to your normal activities as told by your health care provider. Ask your health care provider what activities are safe for you.  · It may take several months to heal completely. Keep all follow-up visits as told by your health care provider.  This information is not intended to replace advice given to you by your health care provider. Make sure you discuss any questions you have with your health care provider.  Document Released: 10/21/2019 Document Revised: 10/21/2019 Document Reviewed: 10/21/2019  [email or web address] Patient Education © 2020 Elsevier Inc.      Depression / Suicide Risk    As you are discharged from this [facility] facility, it is important to learn how to keep safe from harming yourself.    Recognize the warning signs:  · Abrupt changes in personality, positive or negative- including increase in energy   · Giving away possessions  · Change in eating patterns- significant weight changes-  positive or negative  · Change in sleeping patterns- unable to sleep or sleeping all the time   · Unwillingness or inability to communicate  · Depression  · Unusual sadness, discouragement and loneliness  · Talk  of wanting to die  · Neglect of personal appearance   · Rebelliousness- reckless behavior  · Withdrawal from people/activities they love  · Confusion- inability to concentrate     If you or a loved one observes any of these behaviors or has concerns about self-harm, here's what you can do:  · Talk about it- your feelings and reasons for harming yourself  · Remove any means that you might use to hurt yourself (examples: pills, rope, extension cords, firearm)  · Get professional help from the community (Mental Health, Substance Abuse, psychological counseling)  · Do not be alone:Call your Safe Contact- someone whom you trust who will be there for you.  · Call your local CRISIS HOTLINE 083-2201 or 332-193-9855  · Call your local Children's Mobile Crisis Response Team [place] (340) 366-3990 or www.iGoOn s.r.l.  · Call the toll free National Suicide Prevention Hotlines   · National Suicide Prevention Lifeline 457-909-GFNV (2759)  · National Hope Line Network 800-SUICIDE (413-0112)

## 2020-10-01 NOTE — PROGRESS NOTES
Pt. discharged to home. Pt left unit via WC with escort.   IV pulled out.   Crutches delivered to the patient  Reviewed discharge instructions, follow up appointments, and prescription with the patient. Patient states understanding of all the instructions. Discharge instructions and personal belongings with patient upon leaving.

## 2020-10-01 NOTE — DISCHARGE PLANNING
Received Choice form at 1231  Agency/Facility Name: [facility]  Referral sent per Choice form @ 1231

## 2020-10-01 NOTE — PROGRESS NOTES
Report from day RN, POC discussed, patient sitting in bed, resting, denies any pain, denies any numbness and tingling sensation on lower extremeties.

## 2021-06-11 ENCOUNTER — TELEPHONE (OUTPATIENT)
Dept: SCHEDULING | Facility: IMAGING CENTER | Age: 51
End: 2021-06-11

## 2021-09-29 SDOH — HEALTH STABILITY: PHYSICAL HEALTH: ON AVERAGE, HOW MANY DAYS PER WEEK DO YOU ENGAGE IN MODERATE TO STRENUOUS EXERCISE (LIKE A BRISK WALK)?: 6 DAYS

## 2021-09-29 SDOH — HEALTH STABILITY: PHYSICAL HEALTH: ON AVERAGE, HOW MANY MINUTES DO YOU ENGAGE IN EXERCISE AT THIS LEVEL?: 70 MIN

## 2021-09-29 SDOH — ECONOMIC STABILITY: HOUSING INSECURITY
IN THE LAST 12 MONTHS, WAS THERE A TIME WHEN YOU DID NOT HAVE A STEADY PLACE TO SLEEP OR SLEPT IN A SHELTER (INCLUDING NOW)?: NO

## 2021-09-29 SDOH — ECONOMIC STABILITY: HOUSING INSECURITY: IN THE LAST 12 MONTHS, HOW MANY PLACES HAVE YOU LIVED?: 1

## 2021-09-29 SDOH — ECONOMIC STABILITY: INCOME INSECURITY: HOW HARD IS IT FOR YOU TO PAY FOR THE VERY BASICS LIKE FOOD, HOUSING, MEDICAL CARE, AND HEATING?: NOT HARD AT ALL

## 2021-09-29 SDOH — ECONOMIC STABILITY: TRANSPORTATION INSECURITY
IN THE PAST 12 MONTHS, HAS LACK OF RELIABLE TRANSPORTATION KEPT YOU FROM MEDICAL APPOINTMENTS, MEETINGS, WORK OR FROM GETTING THINGS NEEDED FOR DAILY LIVING?: NO

## 2021-09-29 SDOH — ECONOMIC STABILITY: FOOD INSECURITY: WITHIN THE PAST 12 MONTHS, THE FOOD YOU BOUGHT JUST DIDN'T LAST AND YOU DIDN'T HAVE MONEY TO GET MORE.: NEVER TRUE

## 2021-09-29 SDOH — ECONOMIC STABILITY: INCOME INSECURITY: IN THE LAST 12 MONTHS, WAS THERE A TIME WHEN YOU WERE NOT ABLE TO PAY THE MORTGAGE OR RENT ON TIME?: NO

## 2021-09-29 SDOH — ECONOMIC STABILITY: FOOD INSECURITY: WITHIN THE PAST 12 MONTHS, YOU WORRIED THAT YOUR FOOD WOULD RUN OUT BEFORE YOU GOT MONEY TO BUY MORE.: NEVER TRUE

## 2021-09-29 SDOH — HEALTH STABILITY: MENTAL HEALTH
STRESS IS WHEN SOMEONE FEELS TENSE, NERVOUS, ANXIOUS, OR CAN'T SLEEP AT NIGHT BECAUSE THEIR MIND IS TROUBLED. HOW STRESSED ARE YOU?: NOT AT ALL

## 2021-09-29 ASSESSMENT — SOCIAL DETERMINANTS OF HEALTH (SDOH)
IN A TYPICAL WEEK, HOW MANY TIMES DO YOU TALK ON THE PHONE WITH FAMILY, FRIENDS, OR NEIGHBORS?: MORE THAN THREE TIMES A WEEK
HOW OFTEN DO YOU HAVE A DRINK CONTAINING ALCOHOL: NEVER
DO YOU BELONG TO ANY CLUBS OR ORGANIZATIONS SUCH AS CHURCH GROUPS UNIONS, FRATERNAL OR ATHLETIC GROUPS, OR SCHOOL GROUPS?: NO
HOW OFTEN DO YOU HAVE SIX OR MORE DRINKS ON ONE OCCASION: NEVER
HOW OFTEN DO YOU ATTEND CHURCH OR RELIGIOUS SERVICES?: NEVER
HOW HARD IS IT FOR YOU TO PAY FOR THE VERY BASICS LIKE FOOD, HOUSING, MEDICAL CARE, AND HEATING?: NOT HARD AT ALL
HOW OFTEN DO YOU ATTEND CHURCH OR RELIGIOUS SERVICES?: NEVER
WITHIN THE PAST 12 MONTHS, YOU WORRIED THAT YOUR FOOD WOULD RUN OUT BEFORE YOU GOT THE MONEY TO BUY MORE: NEVER TRUE
HOW OFTEN DO YOU ATTENT MEETINGS OF THE CLUB OR ORGANIZATION YOU BELONG TO?: NEVER
HOW OFTEN DO YOU ATTENT MEETINGS OF THE CLUB OR ORGANIZATION YOU BELONG TO?: NEVER
ARE YOU MARRIED, WIDOWED, DIVORCED, SEPARATED, NEVER MARRIED, OR LIVING WITH A PARTNER?: LIVING WITH PARTNER
HOW OFTEN DO YOU GET TOGETHER WITH FRIENDS OR RELATIVES?: ONCE A WEEK
IN A TYPICAL WEEK, HOW MANY TIMES DO YOU TALK ON THE PHONE WITH FAMILY, FRIENDS, OR NEIGHBORS?: MORE THAN THREE TIMES A WEEK
ARE YOU MARRIED, WIDOWED, DIVORCED, SEPARATED, NEVER MARRIED, OR LIVING WITH A PARTNER?: LIVING WITH PARTNER
HOW OFTEN DO YOU GET TOGETHER WITH FRIENDS OR RELATIVES?: ONCE A WEEK
DO YOU BELONG TO ANY CLUBS OR ORGANIZATIONS SUCH AS CHURCH GROUPS UNIONS, FRATERNAL OR ATHLETIC GROUPS, OR SCHOOL GROUPS?: NO

## 2021-09-29 ASSESSMENT — LIFESTYLE VARIABLES
HOW OFTEN DO YOU HAVE SIX OR MORE DRINKS ON ONE OCCASION: NEVER
HOW OFTEN DO YOU HAVE A DRINK CONTAINING ALCOHOL: NEVER

## 2021-09-30 ENCOUNTER — OFFICE VISIT (OUTPATIENT)
Dept: MEDICAL GROUP | Facility: PHYSICIAN GROUP | Age: 51
End: 2021-09-30
Payer: COMMERCIAL

## 2021-09-30 VITALS
HEART RATE: 93 BPM | BODY MASS INDEX: 20.25 KG/M2 | WEIGHT: 133.6 LBS | HEIGHT: 68 IN | TEMPERATURE: 97.9 F | SYSTOLIC BLOOD PRESSURE: 106 MMHG | DIASTOLIC BLOOD PRESSURE: 70 MMHG | OXYGEN SATURATION: 98 %

## 2021-09-30 DIAGNOSIS — Z23 NEED FOR IMMUNIZATION AGAINST INFLUENZA: ICD-10-CM

## 2021-09-30 DIAGNOSIS — Z00.00 ROUTINE PHYSICAL EXAMINATION: ICD-10-CM

## 2021-09-30 DIAGNOSIS — Z12.5 SCREENING FOR PROSTATE CANCER: ICD-10-CM

## 2021-09-30 DIAGNOSIS — Z12.11 SCREEN FOR COLON CANCER: ICD-10-CM

## 2021-09-30 DIAGNOSIS — Z23 NEED FOR DIPHTHERIA-TETANUS-PERTUSSIS (TDAP) VACCINE: ICD-10-CM

## 2021-09-30 PROCEDURE — 99386 PREV VISIT NEW AGE 40-64: CPT | Mod: 25 | Performed by: FAMILY MEDICINE

## 2021-09-30 PROCEDURE — 90472 IMMUNIZATION ADMIN EACH ADD: CPT | Performed by: FAMILY MEDICINE

## 2021-09-30 PROCEDURE — 90471 IMMUNIZATION ADMIN: CPT | Performed by: FAMILY MEDICINE

## 2021-09-30 PROCEDURE — 90715 TDAP VACCINE 7 YRS/> IM: CPT | Performed by: FAMILY MEDICINE

## 2021-09-30 PROCEDURE — 90686 IIV4 VACC NO PRSV 0.5 ML IM: CPT | Performed by: FAMILY MEDICINE

## 2021-09-30 ASSESSMENT — PATIENT HEALTH QUESTIONNAIRE - PHQ9: CLINICAL INTERPRETATION OF PHQ2 SCORE: 0

## 2021-09-30 ASSESSMENT — FIBROSIS 4 INDEX: FIB4 SCORE: 1.73

## 2021-09-30 NOTE — PROGRESS NOTES
Subjective     Diego Giron is a 51 y.o. male who presents with New Patient ([facility])            HPI     This is a 51-year-old white male who is here as a new patient to get established with me.  Moved from [facility] 2 and half years ago due to his significant other's job.    He sustained left femoral neck fracture in September 2020 from a fall from skateboard and underwent IM nailing.  He recovered fully since then.    He is otherwise healthy without any significant medical problems.  He is already 51 years old but he has not done a screening colonoscopy or screening for colon cancer.  He also needs screening labs done.    He said he is a vegan.  Prior to that he was a vegetarian.  He stays very active and exercises very regularly.    Denies any acute problems or concerns today.    I reviewed the following    History reviewed. No pertinent past medical history.     Past Surgical History:   Procedure Laterality Date   • PB OPEN FIX INTER/SUBTROCH FX,IMPLNT Left 9/29/2020    Procedure: INSERTION, INTRAMEDULLARY ELIESER, FEMUR, PROXIMAL;  Surgeon: Akash Driver M.D.;  Location: SURGERY [facility];  Service: Orthopedics       No Known Allergies    No current outpatient medications on file.     No current facility-administered medications for this visit.        Family History   Problem Relation Age of Onset   • Cancer Mother         breast cancer in remission   • No Known Problems Brother    • No Known Problems Half-sister    • No Known Problems Half-sister        Social History     Socioeconomic History   • Marital status: Single     Spouse name: Not on file   • Number of children: 0   • Years of education: Not on file   • Highest education level: Master's degree (e.g., MA, MS, Dwayne, MEd, MSW, KELSIE)   Occupational History   • Occupation:  - currently unemployed   Tobacco Use   • Smoking status: Never Smoker   • Smokeless tobacco: Never Used   Vaping Use   • Vaping Use: Never used   Substance  "and Sexual Activity   • Alcohol use: Not Currently     Comment: experimented ETOH in high school   • Drug use: Never   • Sexual activity: Not on file   Other Topics Concern   • Not on file   Social History Narrative   • Not on file     Social Determinants of Health     Financial Resource Strain: Low Risk    • Difficulty of Paying Living Expenses: Not hard at all   Food Insecurity: No Food Insecurity   • Worried About Running Out of Food in the Last Year: Never true   • Ran Out of Food in the Last Year: Never true   Transportation Needs: No Transportation Needs   • Lack of Transportation (Medical): No   • Lack of Transportation (Non-Medical): No   Physical Activity: Sufficiently Active   • Days of Exercise per Week: 6 days   • Minutes of Exercise per Session: 70 min   Stress: No Stress Concern Present   • Feeling of Stress : Not at all   Social Connections: Moderately Isolated   • Frequency of Communication with Friends and Family: More than three times a week   • Frequency of Social Gatherings with Friends and Family: Once a week   • Attends Confucianist Services: Never   • Active Member of Clubs or Organizations: No   • Attends Club or Organization Meetings: Never   • Marital Status: Living with partner   Intimate Partner Violence:    • Fear of Current or Ex-Partner:    • Emotionally Abused:    • Physically Abused:    • Sexually Abused:         ROS     As per HPI, the rest are negative.           Objective     /70 (BP Location: Left arm, Patient Position: Sitting, BP Cuff Size: Adult)   Pulse 93   Temp 36.6 °C (97.9 °F) (Temporal)   Ht 1.727 m (5' 8\")   Wt 60.6 kg (133 lb 9.6 oz)   SpO2 98%   BMI 20.31 kg/m²      Physical Exam  Vitals and nursing note reviewed. Exam conducted with a chaperone present.   Constitutional:       General: He is not in acute distress.     Appearance: He is well-developed. He is not diaphoretic.   HENT:      Head: Normocephalic and atraumatic.      Right Ear: External ear normal. "      Left Ear: External ear normal.      Nose: Nose normal.      Mouth/Throat:      Pharynx: No oropharyngeal exudate.   Eyes:      General: No scleral icterus.        Right eye: No discharge.         Left eye: No discharge.      Conjunctiva/sclera: Conjunctivae normal.      Pupils: Pupils are equal, round, and reactive to light.   Neck:      Thyroid: No thyromegaly.      Vascular: No JVD.      Trachea: No tracheal deviation.   Cardiovascular:      Rate and Rhythm: Normal rate and regular rhythm.      Heart sounds: Normal heart sounds. No murmur heard.   No friction rub. No gallop.    Pulmonary:      Effort: Pulmonary effort is normal. No respiratory distress.      Breath sounds: Normal breath sounds. No stridor. No wheezing or rales.   Chest:      Chest wall: No tenderness.   Abdominal:      General: Bowel sounds are normal. There is no distension.      Palpations: Abdomen is soft. There is no mass.      Tenderness: There is no abdominal tenderness. There is no guarding or rebound.      Hernia: There is no hernia in the left inguinal area or right inguinal area.      Comments: Small umbilical hernia   Genitourinary:     Penis: Normal and circumcised. No phimosis, erythema, tenderness or discharge.       Testes: Normal.         Right: Mass, tenderness or swelling not present. Right testis is descended.         Left: Mass, tenderness or swelling not present. Left testis is descended.      Epididymis:      Right: Normal.      Left: Normal.      Prostate: Normal. Not enlarged, not tender and no nodules present.      Rectum: Normal. Guaiac result negative. No mass, tenderness, anal fissure, external hemorrhoid or internal hemorrhoid. Normal anal tone.   Musculoskeletal:         General: No tenderness. Normal range of motion.      Cervical back: Normal range of motion and neck supple.   Lymphadenopathy:      Cervical: No cervical adenopathy.      Lower Body: No right inguinal adenopathy. No left inguinal adenopathy.    Skin:     General: Skin is warm and dry.      Coloration: Skin is not pale.      Findings: No erythema or rash.   Neurological:      Mental Status: He is alert and oriented to person, place, and time.      Cranial Nerves: No cranial nerve deficit.      Motor: No abnormal muscle tone.      Coordination: Coordination normal.      Deep Tendon Reflexes: Reflexes normal.   Psychiatric:         Behavior: Behavior normal.         Thought Content: Thought content normal.         Judgment: Judgment normal.                                 Assessment & Plan        1. Routine physical examination  Complete exam was done.  He has a small umbilical hernia which is asymptomatic.  We will keep an eye on this.  We will send him for screening labs.  He needs flu shot today and this was given.  He also needs Tdap and this was also given.  He needs Shingrix vaccine and he will get this another time in 1 month.  He was made aware he needs 2 doses 2 to 6 months apart.  - Lipid Profile; Future  - Comp Metabolic Panel; Future  - CBC WITH DIFFERENTIAL; Future  - PROSTATE SPECIFIC AG SCREENING; Future    2. Need for immunization against influenza  Flu shot was given.  - INFLUENZA VACCINE QUAD INJ (PF)    3. Need for diphtheria-tetanus-pertussis (Tdap) vaccine  Tdap was given.  - Tdap Vaccine =>8YO IM    4. Screening for prostate cancer  We will send him for screening PSA.  - Lipid Profile; Future  - Comp Metabolic Panel; Future  - CBC WITH DIFFERENTIAL; Future  - PROSTATE SPECIFIC AG SCREENING; Future    5. Screen for colon cancer  We discussed screening colonoscopy which is the gold standard for screening colon cancer.  He prefers another way to screen for colon cancer.  He asked about doing the Cologuard.  I offered him the FIT which is available here in the office and we have the kit that we can give him and he agrees to proceed.  He knows that if this is positive for blood we will need to proceed with screening colonoscopy.  Was made  aware that FIT is to be done yearly.  - OCCULT BLOOD FECES IMMUNOASSAY; Future    He will see me yearly for physical exam or sooner if needed.      Please note that this dictation was created using voice recognition software. I have worked with consultants from the vendor as well as technical experts from [facility] to optimize the interface. I have made every reasonable attempt to correct obvious errors, but I expect that there are errors of grammar and possibly content I did not discover before finalizing the note.

## 2021-10-01 ENCOUNTER — HOSPITAL ENCOUNTER (OUTPATIENT)
Facility: MEDICAL CENTER | Age: 51
End: 2021-10-01
Attending: FAMILY MEDICINE
Payer: COMMERCIAL

## 2021-10-01 PROCEDURE — 82274 ASSAY TEST FOR BLOOD FECAL: CPT

## 2021-10-05 DIAGNOSIS — Z12.11 SCREEN FOR COLON CANCER: ICD-10-CM

## 2021-10-08 LAB — IMM ASSAY OCC BLD FITOB: NEGATIVE

## 2021-10-21 ENCOUNTER — HOSPITAL ENCOUNTER (OUTPATIENT)
Dept: LAB | Facility: MEDICAL CENTER | Age: 51
End: 2021-10-21
Attending: FAMILY MEDICINE
Payer: COMMERCIAL

## 2021-10-21 DIAGNOSIS — Z00.00 ROUTINE PHYSICAL EXAMINATION: ICD-10-CM

## 2021-10-21 DIAGNOSIS — R74.8 ELEVATED ALKALINE PHOSPHATASE LEVEL: ICD-10-CM

## 2021-10-21 DIAGNOSIS — Z12.5 SCREENING FOR PROSTATE CANCER: ICD-10-CM

## 2021-10-21 LAB
ALBUMIN SERPL BCP-MCNC: 4.8 G/DL (ref 3.2–4.9)
ALBUMIN/GLOB SERPL: 1.8 G/DL
ALP SERPL-CCNC: 114 U/L (ref 30–99)
ALT SERPL-CCNC: 16 U/L (ref 2–50)
ANION GAP SERPL CALC-SCNC: 11 MMOL/L (ref 7–16)
AST SERPL-CCNC: 21 U/L (ref 12–45)
BASOPHILS # BLD AUTO: 1.1 % (ref 0–1.8)
BASOPHILS # BLD: 0.09 K/UL (ref 0–0.12)
BILIRUB SERPL-MCNC: 0.7 MG/DL (ref 0.1–1.5)
BUN SERPL-MCNC: 10 MG/DL (ref 8–22)
CALCIUM SERPL-MCNC: 9.8 MG/DL (ref 8.5–10.5)
CHLORIDE SERPL-SCNC: 102 MMOL/L (ref 96–112)
CHOLEST SERPL-MCNC: 134 MG/DL (ref 100–199)
CO2 SERPL-SCNC: 27 MMOL/L (ref 20–33)
CREAT SERPL-MCNC: 0.72 MG/DL (ref 0.5–1.4)
EOSINOPHIL # BLD AUTO: 0.26 K/UL (ref 0–0.51)
EOSINOPHIL NFR BLD: 3.3 % (ref 0–6.9)
ERYTHROCYTE [DISTWIDTH] IN BLOOD BY AUTOMATED COUNT: 41 FL (ref 35.9–50)
GLOBULIN SER CALC-MCNC: 2.6 G/DL (ref 1.9–3.5)
GLUCOSE SERPL-MCNC: 87 MG/DL (ref 65–99)
HCT VFR BLD AUTO: 48 % (ref 42–52)
HDLC SERPL-MCNC: 45 MG/DL
HGB BLD-MCNC: 16.1 G/DL (ref 14–18)
IMM GRANULOCYTES # BLD AUTO: 0.03 K/UL (ref 0–0.11)
IMM GRANULOCYTES NFR BLD AUTO: 0.4 % (ref 0–0.9)
LDLC SERPL CALC-MCNC: 81 MG/DL
LYMPHOCYTES # BLD AUTO: 2.15 K/UL (ref 1–4.8)
LYMPHOCYTES NFR BLD: 26.9 % (ref 22–41)
MCH RBC QN AUTO: 30 PG (ref 27–33)
MCHC RBC AUTO-ENTMCNC: 33.5 G/DL (ref 33.7–35.3)
MCV RBC AUTO: 89.6 FL (ref 81.4–97.8)
MONOCYTES # BLD AUTO: 0.59 K/UL (ref 0–0.85)
MONOCYTES NFR BLD AUTO: 7.4 % (ref 0–13.4)
NEUTROPHILS # BLD AUTO: 4.88 K/UL (ref 1.82–7.42)
NEUTROPHILS NFR BLD: 60.9 % (ref 44–72)
NRBC # BLD AUTO: 0 K/UL
NRBC BLD-RTO: 0 /100 WBC
PLATELET # BLD AUTO: 273 K/UL (ref 164–446)
PMV BLD AUTO: 10.8 FL (ref 9–12.9)
POTASSIUM SERPL-SCNC: 3.9 MMOL/L (ref 3.6–5.5)
PROT SERPL-MCNC: 7.4 G/DL (ref 6–8.2)
PSA SERPL-MCNC: 0.73 NG/ML (ref 0–4)
RBC # BLD AUTO: 5.36 M/UL (ref 4.7–6.1)
SODIUM SERPL-SCNC: 140 MMOL/L (ref 135–145)
TRIGL SERPL-MCNC: 39 MG/DL (ref 0–149)
WBC # BLD AUTO: 8 K/UL (ref 4.8–10.8)

## 2021-10-21 PROCEDURE — 84153 ASSAY OF PSA TOTAL: CPT

## 2021-10-21 PROCEDURE — 85025 COMPLETE CBC W/AUTO DIFF WBC: CPT

## 2021-10-21 PROCEDURE — 80053 COMPREHEN METABOLIC PANEL: CPT

## 2021-10-21 PROCEDURE — 36415 COLL VENOUS BLD VENIPUNCTURE: CPT

## 2021-10-21 PROCEDURE — 80061 LIPID PANEL: CPT

## 2021-11-02 ENCOUNTER — NON-PROVIDER VISIT (OUTPATIENT)
Dept: MEDICAL GROUP | Facility: PHYSICIAN GROUP | Age: 51
End: 2021-11-02
Payer: COMMERCIAL

## 2021-11-02 DIAGNOSIS — Z23 NEED FOR SHINGLES VACCINE: ICD-10-CM

## 2021-11-02 PROCEDURE — 90471 IMMUNIZATION ADMIN: CPT | Performed by: FAMILY MEDICINE

## 2021-11-02 PROCEDURE — 90750 HZV VACC RECOMBINANT IM: CPT | Performed by: FAMILY MEDICINE

## 2021-11-02 NOTE — PROGRESS NOTES
"Diego Giron is a 51 y.o. male here for a non-provider visit for:   SHINGRIX (Shingles)    Reason for immunization: Overdue/Provider Recommended  Immunization records indicate need for vaccine: Yes, confirmed with Epic  Minimum interval has been met for this vaccine: Yes  ABN completed: Yes    VIS Dated  10/03/2019 was given to patient: Yes  All IAC Questionnaire questions were answered \"No.\"    Patient tolerated injection and no adverse effects were observed or reported: Yes    Pt scheduled for next dose in series: No  "

## 2022-03-25 ENCOUNTER — HOSPITAL ENCOUNTER (OUTPATIENT)
Dept: LAB | Facility: MEDICAL CENTER | Age: 52
End: 2022-03-25
Attending: FAMILY MEDICINE
Payer: COMMERCIAL

## 2022-03-25 DIAGNOSIS — R74.8 ELEVATED ALKALINE PHOSPHATASE LEVEL: ICD-10-CM

## 2022-03-25 PROCEDURE — 84080 ASSAY ALKALINE PHOSPHATASES: CPT

## 2022-03-25 PROCEDURE — 84075 ASSAY ALKALINE PHOSPHATASE: CPT

## 2022-03-25 PROCEDURE — 36415 COLL VENOUS BLD VENIPUNCTURE: CPT

## 2022-03-30 LAB
ALP BONE SERPL-CCNC: 51 U/L (ref 0–55)
ALP ISOS SERPL HS-CCNC: 0 U/L
ALP LIVER SERPL-CCNC: 54 U/L (ref 0–94)
ALP SERPL-CCNC: 105 U/L (ref 40–120)

## 2024-01-18 SDOH — HEALTH STABILITY: PHYSICAL HEALTH: ON AVERAGE, HOW MANY MINUTES DO YOU ENGAGE IN EXERCISE AT THIS LEVEL?: 50 MIN

## 2024-01-18 SDOH — ECONOMIC STABILITY: HOUSING INSECURITY: IN THE LAST 12 MONTHS, HOW MANY PLACES HAVE YOU LIVED?: 1

## 2024-01-18 SDOH — ECONOMIC STABILITY: INCOME INSECURITY: IN THE LAST 12 MONTHS, WAS THERE A TIME WHEN YOU WERE NOT ABLE TO PAY THE MORTGAGE OR RENT ON TIME?: NO

## 2024-01-18 SDOH — ECONOMIC STABILITY: FOOD INSECURITY: WITHIN THE PAST 12 MONTHS, THE FOOD YOU BOUGHT JUST DIDN'T LAST AND YOU DIDN'T HAVE MONEY TO GET MORE.: NEVER TRUE

## 2024-01-18 SDOH — ECONOMIC STABILITY: INCOME INSECURITY: HOW HARD IS IT FOR YOU TO PAY FOR THE VERY BASICS LIKE FOOD, HOUSING, MEDICAL CARE, AND HEATING?: NOT VERY HARD

## 2024-01-18 SDOH — ECONOMIC STABILITY: FOOD INSECURITY: WITHIN THE PAST 12 MONTHS, YOU WORRIED THAT YOUR FOOD WOULD RUN OUT BEFORE YOU GOT MONEY TO BUY MORE.: NEVER TRUE

## 2024-01-18 SDOH — HEALTH STABILITY: PHYSICAL HEALTH: ON AVERAGE, HOW MANY DAYS PER WEEK DO YOU ENGAGE IN MODERATE TO STRENUOUS EXERCISE (LIKE A BRISK WALK)?: 5 DAYS

## 2024-01-18 ASSESSMENT — SOCIAL DETERMINANTS OF HEALTH (SDOH)
HOW OFTEN DO YOU HAVE SIX OR MORE DRINKS ON ONE OCCASION: NEVER
HOW OFTEN DO YOU GET TOGETHER WITH FRIENDS OR RELATIVES?: ONCE A WEEK
HOW OFTEN DO YOU GET TOGETHER WITH FRIENDS OR RELATIVES?: ONCE A WEEK
HOW OFTEN DO YOU ATTENT MEETINGS OF THE CLUB OR ORGANIZATION YOU BELONG TO?: PATIENT DECLINED
HOW OFTEN DO YOU HAVE A DRINK CONTAINING ALCOHOL: NEVER
ARE YOU MARRIED, WIDOWED, DIVORCED, SEPARATED, NEVER MARRIED, OR LIVING WITH A PARTNER?: LIVING WITH PARTNER
HOW MANY DRINKS CONTAINING ALCOHOL DO YOU HAVE ON A TYPICAL DAY WHEN YOU ARE DRINKING: PATIENT DOES NOT DRINK
ARE YOU MARRIED, WIDOWED, DIVORCED, SEPARATED, NEVER MARRIED, OR LIVING WITH A PARTNER?: LIVING WITH PARTNER
HOW OFTEN DO YOU ATTEND CHURCH OR RELIGIOUS SERVICES?: NEVER
WITHIN THE PAST 12 MONTHS, YOU WORRIED THAT YOUR FOOD WOULD RUN OUT BEFORE YOU GOT THE MONEY TO BUY MORE: NEVER TRUE
DO YOU BELONG TO ANY CLUBS OR ORGANIZATIONS SUCH AS CHURCH GROUPS UNIONS, FRATERNAL OR ATHLETIC GROUPS, OR SCHOOL GROUPS?: NO
IN A TYPICAL WEEK, HOW MANY TIMES DO YOU TALK ON THE PHONE WITH FAMILY, FRIENDS, OR NEIGHBORS?: MORE THAN THREE TIMES A WEEK
HOW OFTEN DO YOU ATTEND CHURCH OR RELIGIOUS SERVICES?: NEVER
DO YOU BELONG TO ANY CLUBS OR ORGANIZATIONS SUCH AS CHURCH GROUPS UNIONS, FRATERNAL OR ATHLETIC GROUPS, OR SCHOOL GROUPS?: NO
HOW OFTEN DO YOU ATTENT MEETINGS OF THE CLUB OR ORGANIZATION YOU BELONG TO?: PATIENT DECLINED
HOW HARD IS IT FOR YOU TO PAY FOR THE VERY BASICS LIKE FOOD, HOUSING, MEDICAL CARE, AND HEATING?: NOT VERY HARD
IN A TYPICAL WEEK, HOW MANY TIMES DO YOU TALK ON THE PHONE WITH FAMILY, FRIENDS, OR NEIGHBORS?: MORE THAN THREE TIMES A WEEK

## 2024-01-18 ASSESSMENT — LIFESTYLE VARIABLES
HOW OFTEN DO YOU HAVE SIX OR MORE DRINKS ON ONE OCCASION: NEVER
SKIP TO QUESTIONS 9-10: 1
AUDIT-C TOTAL SCORE: 0
HOW MANY STANDARD DRINKS CONTAINING ALCOHOL DO YOU HAVE ON A TYPICAL DAY: PATIENT DOES NOT DRINK
HOW OFTEN DO YOU HAVE A DRINK CONTAINING ALCOHOL: NEVER

## 2024-01-23 ENCOUNTER — OFFICE VISIT (OUTPATIENT)
Dept: MEDICAL GROUP | Facility: PHYSICIAN GROUP | Age: 54
End: 2024-01-23
Payer: COMMERCIAL

## 2024-01-23 VITALS
DIASTOLIC BLOOD PRESSURE: 60 MMHG | SYSTOLIC BLOOD PRESSURE: 100 MMHG | BODY MASS INDEX: 19.73 KG/M2 | OXYGEN SATURATION: 98 % | HEIGHT: 68 IN | TEMPERATURE: 97.7 F | HEART RATE: 95 BPM | WEIGHT: 130.2 LBS

## 2024-01-23 DIAGNOSIS — Z23 NEED FOR VACCINATION: ICD-10-CM

## 2024-01-23 DIAGNOSIS — Z87.01 HISTORY OF PNEUMONIA: ICD-10-CM

## 2024-01-23 DIAGNOSIS — Z12.11 SCREENING FOR COLORECTAL CANCER: ICD-10-CM

## 2024-01-23 DIAGNOSIS — Z13.6 SCREENING FOR CARDIOVASCULAR CONDITION: ICD-10-CM

## 2024-01-23 DIAGNOSIS — R29.818 PARKINSONIAN FEATURES: ICD-10-CM

## 2024-01-23 DIAGNOSIS — Z12.12 SCREENING FOR COLORECTAL CANCER: ICD-10-CM

## 2024-01-23 PROBLEM — S72.002A CLOSED FRACTURE OF NECK OF LEFT FEMUR (HCC): Status: RESOLVED | Noted: 2020-09-28 | Resolved: 2024-01-23

## 2024-01-23 PROCEDURE — 3074F SYST BP LT 130 MM HG: CPT

## 2024-01-23 PROCEDURE — 90471 IMMUNIZATION ADMIN: CPT

## 2024-01-23 PROCEDURE — 90677 PCV20 VACCINE IM: CPT

## 2024-01-23 PROCEDURE — 3078F DIAST BP <80 MM HG: CPT

## 2024-01-23 PROCEDURE — 99214 OFFICE O/P EST MOD 30 MIN: CPT | Mod: 25

## 2024-01-23 ASSESSMENT — ENCOUNTER SYMPTOMS
HEADACHES: 0
TREMORS: 1
WEIGHT LOSS: 0
FEVER: 0
NAUSEA: 0
COUGH: 0
MYALGIAS: 0
CONSTIPATION: 0
ABDOMINAL PAIN: 0
CHILLS: 0
SHORTNESS OF BREATH: 0
DIARRHEA: 0
VOMITING: 0
WEAKNESS: 0
DIZZINESS: 0
BLURRED VISION: 0

## 2024-01-23 ASSESSMENT — PATIENT HEALTH QUESTIONNAIRE - PHQ9: CLINICAL INTERPRETATION OF PHQ2 SCORE: 0

## 2024-01-23 NOTE — PROGRESS NOTES
"Subjective:     CC:  Diagnoses of Parkinsonian features, Screening for colorectal cancer, Screening for cardiovascular condition, Need for vaccination, and History of pneumonia were pertinent to this visit.    HISTORY OF THE PRESENT ILLNESS: Patient is a 53 y.o. male. This pleasant patient is here today to establish care and discuss the following problems:    Problem   Parkinsonian Features    Reports ongoing progressive symptoms for one year which include: drooling, muscle rigidity, shuffling gait, facial masking, bilateral upper extremity tremors, weakness, difficulty with fine motor skills L>R, low voice tone, and difficulty swallowing  No family history of Parkinson's      History of Pneumonia    Reports to have gotten severe case of pneumonia with symptoms lasting 1+ months in 2019. He is requesting vaccine.      Closed Fracture of Neck of Left Femur (Hcc) (Resolved)       Health Maintenance: Completed    ROS:   Review of Systems   Constitutional:  Negative for chills, fever, malaise/fatigue and weight loss.   Eyes:  Negative for blurred vision.   Respiratory:  Negative for cough and shortness of breath.    Cardiovascular:  Negative for chest pain.   Gastrointestinal:  Negative for abdominal pain, constipation, diarrhea, nausea and vomiting.   Musculoskeletal:  Negative for myalgias.   Neurological:  Positive for tremors. Negative for dizziness, weakness and headaches.         Objective:     Exam: /60 (BP Location: Left arm, Patient Position: Sitting, BP Cuff Size: Adult)   Pulse 95   Temp 36.5 °C (97.7 °F) (Temporal)   Ht 1.727 m (5' 8\")   Wt 59.1 kg (130 lb 3.2 oz)   SpO2 98%  Body mass index is 19.8 kg/m².    Physical Exam  Constitutional:       Appearance: Normal appearance.   HENT:      Head: Normocephalic.   Eyes:      Conjunctiva/sclera: Conjunctivae normal.      Pupils: Pupils are equal, round, and reactive to light.   Cardiovascular:      Rate and Rhythm: Normal rate and regular rhythm.      " Heart sounds: No murmur heard.  Pulmonary:      Effort: Pulmonary effort is normal. No respiratory distress.      Breath sounds: Normal breath sounds.   Musculoskeletal:         General: Normal range of motion.   Skin:     General: Skin is warm and dry.   Neurological:      General: No focal deficit present.      Mental Status: He is alert and oriented to person, place, and time.      Cranial Nerves: No cranial nerve deficit.      Coordination: Coordination abnormal.      Gait: Gait abnormal.   Psychiatric:         Mood and Affect: Mood normal.         Behavior: Behavior normal.           Labs:   No visits with results within 1 Month(s) from this visit.   Latest known visit with results is:   Hospital Outpatient Visit on 03/25/2022   Component Date Value    Alkaline Phosphatase 03/25/2022 105     Liver Fractions 03/25/2022 54     Bone Fractions 03/25/2022 51     Alk Phos Other Calc 03/25/2022 0      Lab Results   Component Value Date/Time    CHOLSTRLTOT 134 10/21/2021 09:04 AM    LDL 81 10/21/2021 09:04 AM    HDL 45 10/21/2021 09:04 AM    TRIGLYCERIDE 39 10/21/2021 09:04 AM       Lab Results   Component Value Date/Time    SODIUM 140 10/21/2021 09:04 AM    POTASSIUM 3.9 10/21/2021 09:04 AM    CHLORIDE 102 10/21/2021 09:04 AM    CO2 27 10/21/2021 09:04 AM    GLUCOSE 87 10/21/2021 09:04 AM    BUN 10 10/21/2021 09:04 AM    CREATININE 0.72 10/21/2021 09:04 AM     Lab Results   Component Value Date/Time    ALKPHOSPHAT 114 (H) 10/21/2021 09:04 AM    ASTSGOT 21 10/21/2021 09:04 AM    ALTSGPT 16 10/21/2021 09:04 AM    TBILIRUBIN 0.7 10/21/2021 09:04 AM         Assessment & Plan: Medical Decision Making   53 y.o. male with the following -    Problem List Items Addressed This Visit       Parkinsonian features     Undiagnosed condition of uncertain prognosis  Urgent referral placed to neurology movement clinic  Labs as follows         Relevant Orders    Referral to Neurology    Comp Metabolic Panel    CBC WITH DIFFERENTIAL     History of pneumonia     Chronic stable  Vaccine given in clinic today         Relevant Orders    Pneumococcal Conjugate Vaccine 20-Valent (6 wks+)     Other Visit Diagnoses       Screening for colorectal cancer        Relevant Orders    COLOGUARD (FIT DNA)    Screening for cardiovascular condition        Relevant Orders    Lipid Profile    Need for vaccination        Relevant Orders    Pneumococcal Conjugate Vaccine 20-Valent (6 wks+)            Differential diagnosis, natural history, supportive care, and indications for immediate follow-up discussed.  Shared decision making approach was utilized, and patient is amendable with plan of care.  Patient understands to return to clinic or go to the emergency department if symptoms worsen. All questions and concerns addressed to the best of my knowledge.      Return in about 3 months (around 4/23/2024), or if symptoms worsen or fail to improve.    Please note that this dictation was created using voice recognition software. I have made every reasonable attempt to correct obvious errors, but I expect that there are errors of grammar and possibly content that I did not discover before finalizing the note.

## 2024-01-23 NOTE — ASSESSMENT & PLAN NOTE
Undiagnosed condition of uncertain prognosis  Urgent referral placed to neurology movement clinic  Labs as follows

## 2024-01-25 ENCOUNTER — OFFICE VISIT (OUTPATIENT)
Dept: NEUROLOGY | Facility: MEDICAL CENTER | Age: 54
End: 2024-01-25
Attending: INTERNAL MEDICINE
Payer: COMMERCIAL

## 2024-01-25 ENCOUNTER — TELEPHONE (OUTPATIENT)
Dept: NEUROLOGY | Facility: MEDICAL CENTER | Age: 54
End: 2024-01-25

## 2024-01-25 VITALS
SYSTOLIC BLOOD PRESSURE: 104 MMHG | HEART RATE: 85 BPM | DIASTOLIC BLOOD PRESSURE: 60 MMHG | OXYGEN SATURATION: 99 % | TEMPERATURE: 96.9 F | BODY MASS INDEX: 19.98 KG/M2 | HEIGHT: 68 IN | WEIGHT: 131.84 LBS

## 2024-01-25 DIAGNOSIS — R29.818 PARKINSONIAN FEATURES: ICD-10-CM

## 2024-01-25 DIAGNOSIS — G20.C PRIMARY PARKINSONISM (HCC): Primary | ICD-10-CM

## 2024-01-25 PROCEDURE — 3074F SYST BP LT 130 MM HG: CPT | Performed by: INTERNAL MEDICINE

## 2024-01-25 PROCEDURE — 99205 OFFICE O/P NEW HI 60 MIN: CPT | Performed by: INTERNAL MEDICINE

## 2024-01-25 PROCEDURE — 3078F DIAST BP <80 MM HG: CPT | Performed by: INTERNAL MEDICINE

## 2024-01-25 PROCEDURE — 99211 OFF/OP EST MAY X REQ PHY/QHP: CPT | Performed by: INTERNAL MEDICINE

## 2024-01-25 NOTE — PATIENT INSTRUCTIONS
PARKINSON SUPPORT [facility]  [email or web address]     [employer]  [place] [phone], [place] [phone]  [employer]  www.[email or web address].GroundWork    [employer] ([employer]) is a first-of-its-kind, [place]   nonprofit gym founded in 2006 to provide an effective form of physical   exercise to people who are living with Parkinson’s. Though it may seem   surprising, this non-contact boxing-inspired fitness routine is   dramatically improving the ability of people with Parkinson’s to live   independent lives. [employer] was founded by former [facility] prosecutor Josr Goins, who was diagnosed with   Parkinson’s disease at age 39.     Various studies in the 1980s and 1990s supported the notion that   rigorous exercise, emphasizing gross motor movement, balance, core   strength, and rhythm, could favorably impact range of motion,   flexibility, posture, gait, and activities of daily living. More recent   studies, most notably at [facility], focus on the concept   of intense “forced” exercise, and have begun to suggest that certain   kinds of exercise may be neuro-protective, i.e., actually slowing disease   progression.     [employer] enables people with Parkinson’s disease to fight their disease by   providing non-contact boxing-style fitness programs that improve their   quality of life and sense of efficacy and self-worth. Recent studies also   suggest that intense exercise programs may be “neuro-protective,”   actually working to delay the progression of symptoms. [employer] provides   encouragement through a “tough love” approach, inspiring maximum   effort, speed, strength, balance, and flexibility. Boxing works by moving   your body in all planes of motion while continuously changing the   routine as you progress through the workout. [employer] classes have proven   that anyone at any level of Parkinson’s can actually lessen their   symptoms and lead a  healthier/happier life.     What We Offer   1. Classes: We offer two different levels of classes to accommodate   varying degrees of Parkinson’s and fitness levels.   2. Camaraderie: Friends for fighters and caregivers. (aka cornermen)   3. Hope: We empower you to Fight Back against Parkinson’s disease.    [employer] [facility]: [address], [place]  [place] [employer] - Tuesday/Thursday at 3:00pm-4:00pm  [employer]/Seated Boxing - Tues/Thurs at 2:00pm-2:50pm     [place]  [employer] [facility]: [address] [address], [facility] - Monday/Wednesday/Friday at 3:00pm-4:00pm  Senior Work Out - Mon/Fri at 4:00pm-5:00pm    Our group is a family that fights Parkinson's together and we meet outside of our workouts in several ways.  Monthly breakfasts are on the first Wednesday of the month at 11:00am.  [facility] Parkinson’s Support Group the second Friday of the month at 12:00pm.  Monthly lunches are on the third Wednesday of the month at the [employer].  Monthly Zoom Contact Call the last Tuesday of the month at 9:00am.     Frequently updated list of online exercise classes:   med.[place].edu/parkinsons/living-with-PD/exercise-videos.html    A selected few from that list:  www.parkinsonseurope.org/exercisecast/  videos.aarp.org/search?q=exercise  www.[employer].org/videos/  www.[employer].com/c/APDAGreaterStLouisChapter  www.[employer]herrish.org/youtube    Resources for Parkinson's Disease:     Informational Resources  [employer] - www."[employer].org  [place] [employer] Foundation - www.davisphinneyfoundation.org  [employer] Parkinsons Foundation - www.parkinson.org  [employer]ah Harshal Foundation - www.braingrant.org    LSVT BIG (Physical) and LOUD (Speech) Therapy: www.lsvtSecurant    Tools  [employer] - make-up line designed by a person with PD for easier use  Liftware - utensils for use with tremors

## 2024-01-25 NOTE — PROGRESS NOTES
[facility]  [address] [place] [address], [address]. [place] [address]  Phone: [phone] Patient Name: Diego Giron  : 1970  MRN: [id number]     ASSESSMENT / PLAN       Diego Giron is a 53 y.o. RHD male presenting for tremors and rigidity    Parkinsonism  - MRI Brain without contrast: they will call to schedule  ADDENDUM 3/2024  No acute process. A few scattered nonspecific T2 hyperintensities are present in the deep white matter, within expected limits for the age of the patient, most likely related to leukoariosis.    - Labs: TSH, Vit B12, D, ceruloplasmin  ADDENDUM 3/2024: Labs normal except for vitamin D deficiency.  If you are not already taking a supplement, I would recommend starting vitamin D 7824-8834 IU daily.  We can recheck in 3 months.    - START carbidopa/levodopa 25/100mg tablet:  Week 1: take ½ tab every evening   Week 2: take ½ tab twice a day  Week 3: take ½ tab three times a day  Week 4: take ½ tab morning and noon and 1 tab in the evening   Week 5: take ½ tab in the morning and 1 tab noon and evening  Week 6 and thereafter: take 1 tab three times a day  Take the first dose when you wake up, then 5 hours apart. Take it 30 min before food. If it causes nausea, take it with carbs (banana/ cracker) but avoid proteins (nuts/diary/meat) for 30 min. If you have already eaten protein, wait 1 hour before taking medication.      - Return in about 2 months (around 3/25/2024).    Jeremiah Childs DO  Neurology, Movement Disorders Specialist    BILLING DOCUMENTATION:   I spent 60 minutes reviewing the medical record, interviewing and examining the patient, discussing diagnosis and treatment, and coordinating care.          HISTORY OF PRESENT ILLNESS      Diego Giron is a 53 y.o. RHD male presenting for tremors and rigidity    Initial HPI 24  Onset: approx , tremor in both hands. Dexterity not as good. Overall feeling more rigid, more drooling. Handwriting worse  at least 1.5 years ago. More trouble getting dressed.    Gait: had left hip fracture 2020, but walking had changed. More shuffling.   Neuroleptics/pesticide exposure: ages [age] summers on a farm,  planes  Anosmia: no changes  Family history: grandmother had head tremor    Current Regimen    Latency:   Duration:   Efficacy:  Dyskinesia/Dystonia:     ROS:  Orthostasis: no issues  Constipation: no issues. He said he is a vegan. Previously vegetarian.   Urinary issues: no issues  Speech/Swallow: Voice quieter. Harder to swallow later in day  Cognition: no issues  Mood: no anxiety, no depression  Hallucinations: no issues  Sleep/RBD: no issues, no snoring, no RBD.           1/23/2024     8:20 AM 9/30/2021     9:00 AM   PHQ-9 Screening   Little interest or pleasure in doing things 0 - not at all 0 - not at all   Feeling down, depressed, or hopeless 0 - not at all 0 - not at all   PHQ-2 Total Score 0 0       Interpretation of PHQ-9 Total Score   Score Severity   1-4 No Depression   5-9 Mild Depression   10-14 Moderate Depression   15-19 Moderately Severe Depression   20-27 Severe Depression     Past Medical History:   Diagnosis Date    Closed fracture of neck of left femur (HCC) 09/28/2020       Past Surgical History:   Procedure Laterality Date    PB OPEN FIX INTER/SUBTROCH FX,IMPLNT Left 9/29/2020    Procedure: INSERTION, INTRAMEDULLARY ELIESER, FEMUR, PROXIMAL;  Surgeon: Akash Driver M.D.;  Location: SURGERY [facility];  Service: Orthopedics       Family History   Problem Relation Age of Onset    Cancer Mother         breast cancer in remission    Drug abuse Father         possible liver failure    No Known Problems Brother     No Known Problems Half-sister     No Known Problems Half-sister        Social History     Socioeconomic History    Marital status: Single     Spouse name: Not on file    Number of children: 0    Years of education: Not on file    Highest education level: Master's degree (e.g., MA,  MS, Dwayne, MEd, MSW, KELSIE)   Occupational History    Occupation:  - currently unemployed   Tobacco Use    Smoking status: Never    Smokeless tobacco: Never   Vaping Use    Vaping Use: Never used   Substance and Sexual Activity    Alcohol use: Not Currently     Comment: experimented ETOH in high school    Drug use: Never    Sexual activity: Not on file   Other Topics Concern    Not on file   Social History Narrative    Not on file     Social Determinants of Health     Financial Resource Strain: Low Risk  (1/18/2024)    Overall Financial Resource Strain (CARDIA)     Difficulty of Paying Living Expenses: Not very hard   Food Insecurity: No Food Insecurity (1/18/2024)    Hunger Vital Sign     Worried About Running Out of Food in the Last Year: Never true     Ran Out of Food in the Last Year: Never true   Transportation Needs: No Transportation Needs (1/18/2024)    PRAPARE - Transportation     Lack of Transportation (Medical): No     Lack of Transportation (Non-Medical): No   Physical Activity: Sufficiently Active (1/18/2024)    Exercise Vital Sign     Days of Exercise per Week: 5 days     Minutes of Exercise per Session: 50 min   Stress: No Stress Concern Present (1/18/2024)    [employer] - Occupational Stress Questionnaire     Feeling of Stress : Not at all   Social Connections: Moderately Isolated (1/18/2024)    Social Connection and Isolation Panel [NHANES]     Frequency of Communication with Friends and Family: More than three times a week     Frequency of Social Gatherings with Friends and Family: Once a week     Attends Amish Services: Never     Active Member of Clubs or Organizations: No     Attends Club or Organization Meetings: Patient refused     Marital Status: Living with partner   Intimate Partner Violence: Not on file   Housing Stability: Low Risk  (1/18/2024)    Housing Stability Vital Sign     Unable to Pay for Housing in the Last Year: No     Number of  "Places Lived in the Last Year: 1     Unstable Housing in the Last Year: No       Current Outpatient Medications   Medication    carbidopa-levodopa (SINEMET)  MG Tab     No current facility-administered medications for this visit.       No Known Allergies    DATA / RESULTS      LDL   Date Value Ref Range Status   10/21/2021 81 <100 mg/dL Final          OBJECTIVE      Vitals:    01/25/24 0825   BP: 104/60   BP Location: Right arm   Patient Position: Sitting   BP Cuff Size: Adult   Pulse: 85   Temp: 36.1 °C (96.9 °F)   TempSrc: Temporal   SpO2: 99%   Weight: 59.8 kg (131 lb 13.4 oz)   Height: 1.727 m (5' 8\")     Physical Exam     General: NAD, appears stated age.      Mental status: Speech clear and fluent without tremor. Fund of knowledge is good.      Cranial Nerves:  CN2: PERRL. Visual fields are full to finger confrontation.   CN3/4/6: EOMI. There is no nystagmus. Saccades are normal.   CN5: V1-V3 intact to light touch    CN7: Symmetric face. +hypomimia  CN8: Hearing grossly intact.   CN9/10/12: Soft palate and uvula rise symmetrically. Tongue midline.   CN11: Shoulder shrug intact bilaterally.     Strength  Right Left   Shoulder Abduction  5 5   Elbow Flexion 5 5   Elbow Extension  5 5   Wrist Extension  5 5   Finger Extension  5 5   Hip Flexion  5 5   Knee Extension 5 5   Ankle Dorsiflexion  5 5     Deep Tendon Reflexes: 3+ biceps, brachioradialis, patella and ankles. Plantar reflexes in flexion. Negative hoffmans.      Abnormal movements:    UPDRS Right Left   Finger tapping 2 2   Hand Movement 2 2   Toe Tapping 2 2   Leg Agility 2 2   Rigidity 2 2   Rest Tremor 0 0   Postural Tremor 1 1   Kinetic Tremor 2 2      No dystonia, dyskinesias, tics, stereotypies, athetosis, akathisia, or chorea noted.      Sensory: normal to sharp, temperature.    Quantitative tuning fork Right Left   Hands 8 8   Feet 6 6       Cerebellar: No dysmetria with FTN or heel-to-shin.    Gait:   Posture - normal   Base - narrow "   Stride length - normal   Arm swing - minimal bilaterally   Speed - slow  Shuffling/freezing - none  U-Turn - en bloc turn 4-5 steps     PROCEDURE     N/A

## 2024-01-25 NOTE — TELEPHONE ENCOUNTER
Received Refill PA request via [facility]  for Carbidopa-Levodopa  MG Tabs. (Quantity:90, Day Supply:30) - Copay $13.71 Max 30 DS (No PA req'd)     Insurance: GILBERTO [employer]  Member ID:  [id number]  BIN: [id number]  PCN: [id number]  Group: [id number]     Ran Test claim via [place] & medication Pays for a $13.71 copay. Will outreach to patient to offer specialty pharmacy services and or release to preferred pharmacy

## 2024-01-26 ENCOUNTER — TELEPHONE (OUTPATIENT)
Dept: NEUROLOGY | Facility: MEDICAL CENTER | Age: 54
End: 2024-01-26
Payer: COMMERCIAL

## 2024-01-26 NOTE — TELEPHONE ENCOUNTER
Attempted to contact patient at [phone] to discuss [facility] and services/benefits offered. No answer, left voicemail.    Trina Mandel

## 2024-02-02 ENCOUNTER — TELEPHONE (OUTPATIENT)
Dept: NEUROLOGY | Facility: MEDICAL CENTER | Age: 54
End: 2024-02-02
Payer: COMMERCIAL

## 2024-02-02 DIAGNOSIS — G20.C PRIMARY PARKINSONISM (HCC): ICD-10-CM

## 2024-02-02 NOTE — TELEPHONE ENCOUNTER
Carbidopa-Levodopa  MG Tabs    RTS - RX ELIGIBLE FOR SYNCHRONIZATION.IF NEEDE D,SHORTEN DS & RESUBMIT WITH [facility] 61/48 NEXT RFL [id number] DAYS TO RFL 15 LAST FILL [id number] VIA RETAIL [id number]  + - 02/02/2024 1:44pm [facility]

## 2024-02-02 NOTE — TELEPHONE ENCOUNTER
Received request via: Pharmacy    Medication Name/Dosage carbidopa-levodopa (SINEMET)  MG Tab     When was medication last prescribed 01/25/24    How many refills were previously provided 3    How many Refills does he patient have left from last prescription 0    Was the patient seen in the last year in this department? Yes   Date of last office visit 01/25/24     Per last Neurology Office Visit, when was the date of next follow up visit set for?                            Date of office visit follow up request 2 months      Does the patient have an upcoming appointment? Yes   If yes, when 03/26/24             If no, schedule appointment N/A     Does the patient have [facility] Plus and need 100 day supply (blood pressure, diabetes and cholesterol meds only)? Patient does not have SCP

## 2024-02-26 ENCOUNTER — PHARMACY VISIT (OUTPATIENT)
Dept: PHARMACY | Facility: MEDICAL CENTER | Age: 54
End: 2024-02-26
Payer: COMMERCIAL

## 2024-02-26 PROCEDURE — RXMED WILLOW AMBULATORY MEDICATION CHARGE: Performed by: INTERNAL MEDICINE

## 2024-03-15 ENCOUNTER — HOSPITAL ENCOUNTER (OUTPATIENT)
Dept: LAB | Facility: MEDICAL CENTER | Age: 54
End: 2024-03-15
Attending: INTERNAL MEDICINE
Payer: COMMERCIAL

## 2024-03-15 ENCOUNTER — HOSPITAL ENCOUNTER (OUTPATIENT)
Dept: LAB | Facility: MEDICAL CENTER | Age: 54
End: 2024-03-15
Payer: COMMERCIAL

## 2024-03-15 DIAGNOSIS — R29.818 PARKINSONIAN FEATURES: ICD-10-CM

## 2024-03-15 DIAGNOSIS — Z13.6 SCREENING FOR CARDIOVASCULAR CONDITION: ICD-10-CM

## 2024-03-15 DIAGNOSIS — G20.C PRIMARY PARKINSONISM (HCC): ICD-10-CM

## 2024-03-15 LAB
25(OH)D3 SERPL-MCNC: 19 NG/ML (ref 30–100)
ALBUMIN SERPL BCP-MCNC: 4.6 G/DL (ref 3.2–4.9)
ALBUMIN/GLOB SERPL: 1.8 G/DL
ALP SERPL-CCNC: 118 U/L (ref 30–99)
ALT SERPL-CCNC: 6 U/L (ref 2–50)
ANION GAP SERPL CALC-SCNC: 10 MMOL/L (ref 7–16)
AST SERPL-CCNC: 33 U/L (ref 12–45)
BASOPHILS # BLD AUTO: 1.7 % (ref 0–1.8)
BASOPHILS # BLD: 0.13 K/UL (ref 0–0.12)
BILIRUB SERPL-MCNC: 0.4 MG/DL (ref 0.1–1.5)
BUN SERPL-MCNC: 11 MG/DL (ref 8–22)
CALCIUM ALBUM COR SERPL-MCNC: 8.7 MG/DL (ref 8.5–10.5)
CALCIUM SERPL-MCNC: 9.2 MG/DL (ref 8.5–10.5)
CHLORIDE SERPL-SCNC: 102 MMOL/L (ref 96–112)
CHOLEST SERPL-MCNC: 149 MG/DL (ref 100–199)
CO2 SERPL-SCNC: 27 MMOL/L (ref 20–33)
CREAT SERPL-MCNC: 0.8 MG/DL (ref 0.5–1.4)
EOSINOPHIL # BLD AUTO: 0.35 K/UL (ref 0–0.51)
EOSINOPHIL NFR BLD: 4.6 % (ref 0–6.9)
ERYTHROCYTE [DISTWIDTH] IN BLOOD BY AUTOMATED COUNT: 41.9 FL (ref 35.9–50)
GFR SERPLBLD CREATININE-BSD FMLA CKD-EPI: 104 ML/MIN/1.73 M 2
GLOBULIN SER CALC-MCNC: 2.5 G/DL (ref 1.9–3.5)
GLUCOSE SERPL-MCNC: 98 MG/DL (ref 65–99)
HCT VFR BLD AUTO: 46.2 % (ref 42–52)
HDLC SERPL-MCNC: 49 MG/DL
HGB BLD-MCNC: 15.5 G/DL (ref 14–18)
IMM GRANULOCYTES # BLD AUTO: 0.03 K/UL (ref 0–0.11)
IMM GRANULOCYTES NFR BLD AUTO: 0.4 % (ref 0–0.9)
LDLC SERPL CALC-MCNC: 94 MG/DL
LYMPHOCYTES # BLD AUTO: 1.86 K/UL (ref 1–4.8)
LYMPHOCYTES NFR BLD: 24.4 % (ref 22–41)
MCH RBC QN AUTO: 30 PG (ref 27–33)
MCHC RBC AUTO-ENTMCNC: 33.5 G/DL (ref 32.3–36.5)
MCV RBC AUTO: 89.4 FL (ref 81.4–97.8)
MONOCYTES # BLD AUTO: 0.58 K/UL (ref 0–0.85)
MONOCYTES NFR BLD AUTO: 7.6 % (ref 0–13.4)
NEUTROPHILS # BLD AUTO: 4.68 K/UL (ref 1.82–7.42)
NEUTROPHILS NFR BLD: 61.3 % (ref 44–72)
NRBC # BLD AUTO: 0 K/UL
NRBC BLD-RTO: 0 /100 WBC (ref 0–0.2)
PLATELET # BLD AUTO: 323 K/UL (ref 164–446)
PMV BLD AUTO: 9.8 FL (ref 9–12.9)
POTASSIUM SERPL-SCNC: 4.7 MMOL/L (ref 3.6–5.5)
PROT SERPL-MCNC: 7.1 G/DL (ref 6–8.2)
RBC # BLD AUTO: 5.17 M/UL (ref 4.7–6.1)
SODIUM SERPL-SCNC: 139 MMOL/L (ref 135–145)
TRIGL SERPL-MCNC: 31 MG/DL (ref 0–149)
TSH SERPL DL<=0.005 MIU/L-ACNC: 2.11 UIU/ML (ref 0.38–5.33)
WBC # BLD AUTO: 7.6 K/UL (ref 4.8–10.8)

## 2024-03-15 PROCEDURE — 82306 VITAMIN D 25 HYDROXY: CPT

## 2024-03-15 PROCEDURE — 80053 COMPREHEN METABOLIC PANEL: CPT

## 2024-03-15 PROCEDURE — 85025 COMPLETE CBC W/AUTO DIFF WBC: CPT

## 2024-03-15 PROCEDURE — 80061 LIPID PANEL: CPT

## 2024-03-15 PROCEDURE — 36415 COLL VENOUS BLD VENIPUNCTURE: CPT

## 2024-03-15 PROCEDURE — 84443 ASSAY THYROID STIM HORMONE: CPT

## 2024-03-15 PROCEDURE — 82390 ASSAY OF CERULOPLASMIN: CPT

## 2024-03-16 LAB — CERULOPLASMIN SERPL-MCNC: 20 MG/DL (ref 15–30)

## 2024-03-21 ENCOUNTER — HOSPITAL ENCOUNTER (OUTPATIENT)
Dept: RADIOLOGY | Facility: MEDICAL CENTER | Age: 54
End: 2024-03-21
Attending: INTERNAL MEDICINE
Payer: COMMERCIAL

## 2024-03-21 DIAGNOSIS — G20.C PRIMARY PARKINSONISM (HCC): ICD-10-CM

## 2024-03-21 PROCEDURE — 70551 MRI BRAIN STEM W/O DYE: CPT

## 2024-03-26 ENCOUNTER — OFFICE VISIT (OUTPATIENT)
Dept: NEUROLOGY | Facility: MEDICAL CENTER | Age: 54
End: 2024-03-26
Attending: INTERNAL MEDICINE
Payer: COMMERCIAL

## 2024-03-26 ENCOUNTER — TELEPHONE (OUTPATIENT)
Dept: NEUROLOGY | Facility: MEDICAL CENTER | Age: 54
End: 2024-03-26

## 2024-03-26 VITALS
RESPIRATION RATE: 18 BRPM | BODY MASS INDEX: 20.92 KG/M2 | TEMPERATURE: 98.5 F | HEIGHT: 68 IN | WEIGHT: 138.01 LBS | DIASTOLIC BLOOD PRESSURE: 80 MMHG | HEART RATE: 75 BPM | SYSTOLIC BLOOD PRESSURE: 118 MMHG | OXYGEN SATURATION: 97 %

## 2024-03-26 DIAGNOSIS — G20.A1 PARKINSON'S DISEASE WITHOUT DYSKINESIA OR FLUCTUATING MANIFESTATIONS (HCC): ICD-10-CM

## 2024-03-26 PROCEDURE — 99211 OFF/OP EST MAY X REQ PHY/QHP: CPT | Performed by: INTERNAL MEDICINE

## 2024-03-26 PROCEDURE — RXMED WILLOW AMBULATORY MEDICATION CHARGE: Performed by: INTERNAL MEDICINE

## 2024-03-26 ASSESSMENT — FIBROSIS 4 INDEX: FIB4 SCORE: 2.29

## 2024-03-26 NOTE — PROGRESS NOTES
[facility]  [address] [place] [address], [address]. [place] [address]  Phone: [phone] Patient Name: Diego Giron  : 1970  MRN: [id number]     ASSESSMENT / PLAN       Diego Giron is a 53 y.o. RHD male presenting for tremors and rigidity    Parkinson's disease  Onset approx , diagnosis in . MRI Brain 3/2024 unremarkable.   Exam notable for left > right bradykinesia and mild rest tremor. Has been responsive to C/L, improvements in motor and non-motor symptoms. Appears undertreated today and will increase dose further.     - INCREASE carbidopa/levodopa 25/100mg tablet: 1.5 tab, three times a day  Take the first dose when you wake up, then 5 hours apart. Take it 30 min before food. If it causes nausea, take it with carbs (banana/ cracker) but avoid proteins (nuts/diary/meat) for 30 min. If you have already eaten protein, wait 1 hour before taking medication.  - emphasized exercise  - PSCNN discussed    Vit D deficiency  Vit D 3/2024: 19 L  - recheck in 2024  - continue Vit D 5000IU daily    - Return in about 3 months (around 2024).    Jeremiah Childs DO  Neurology, Movement Disorders Specialist    BILLING DOCUMENTATION:   I spent 40 minutes reviewing the medical record, interviewing and examining the patient, discussing diagnosis and treatment, and coordinating care.          HISTORY OF PRESENT ILLNESS      Diego Giron is a 53 y.o. RHD male presenting for tremors and rigidity    Initial HPI 24  Onset: approx , tremor in both hands. Dexterity not as good. Overall feeling more rigid, more drooling. Handwriting worse at least 1.5 years ago. More trouble getting dressed.    Gait: had left hip fracture , but walking had changed. More shuffling.   Neuroleptics/pesticide exposure: ages [age] summers on a farm,  planes  Anosmia: no changes  Family history: grandmother had head tremor    Current Regimen  carbidopa/levodopa 25/100mg tablet: 1 tab,  three times a day (8AM, 1PM, 6PM)  Latency: hard to tell  Duration: no wearing off  Efficacy: better with swallowing, dexterity, voice, tremors, mood  Dyskinesia/Dystonia: none  Sfx: GI symptoms better with food    ROS:  Orthostasis: no issues  Constipation: no issues. He said he is a vegan. Previously vegetarian. Intermittent constipation  Urinary issues: no issues  Speech/Swallow: Voice quieter. Harder to swallow later in day. Better.  Cognition: no issues  Mood: no anxiety, no depression  Hallucinations: no issues  Sleep/RBD: no issues, no snoring, no RBD.           1/23/2024     8:20 AM 9/30/2021     9:00 AM   PHQ-9 Screening   Little interest or pleasure in doing things 0 - not at all 0 - not at all   Feeling down, depressed, or hopeless 0 - not at all 0 - not at all   PHQ-2 Total Score 0 0       Interpretation of PHQ-9 Total Score   Score Severity   1-4 No Depression   5-9 Mild Depression   10-14 Moderate Depression   15-19 Moderately Severe Depression   20-27 Severe Depression     Past Medical History:   Diagnosis Date    Closed fracture of neck of left femur (HCC) 09/28/2020       Past Surgical History:   Procedure Laterality Date    PB OPEN FIX INTER/SUBTROCH FX,IMPLNT Left 9/29/2020    Procedure: INSERTION, INTRAMEDULLARY ELIESER, FEMUR, PROXIMAL;  Surgeon: Akash Driver M.D.;  Location: SURGERY [facility];  Service: Orthopedics       Family History   Problem Relation Age of Onset    Cancer Mother         breast cancer in remission    Drug abuse Father         possible liver failure    No Known Problems Brother     No Known Problems Half-sister     No Known Problems Half-sister        Social History     Socioeconomic History    Marital status: Single     Spouse name: Not on file    Number of children: 0    Years of education: Not on file    Highest education level: Master's degree (e.g., MA, MS, Dwayne, MEd, MSW, KELSIE)   Occupational History    Occupation:  - currently unemployed   Tobacco Use     Smoking status: Never    Smokeless tobacco: Never   Vaping Use    Vaping Use: Never used   Substance and Sexual Activity    Alcohol use: Not Currently     Comment: experimented ETOH in high school    Drug use: Never    Sexual activity: Not on file   Other Topics Concern    Not on file   Social History Narrative    Not on file     Social Determinants of Health     Financial Resource Strain: Low Risk  (1/18/2024)    Overall Financial Resource Strain (CARDIA)     Difficulty of Paying Living Expenses: Not very hard   Food Insecurity: No Food Insecurity (1/18/2024)    Hunger Vital Sign     Worried About Running Out of Food in the Last Year: Never true     Ran Out of Food in the Last Year: Never true   Transportation Needs: No Transportation Needs (1/18/2024)    PRAPARE - Transportation     Lack of Transportation (Medical): No     Lack of Transportation (Non-Medical): No   Physical Activity: Sufficiently Active (1/18/2024)    Exercise Vital Sign     Days of Exercise per Week: 5 days     Minutes of Exercise per Session: 50 min   Stress: No Stress Concern Present (1/18/2024)    [employer] [place] - Occupational Stress Questionnaire     Feeling of Stress : Not at all   Social Connections: Moderately Isolated (1/18/2024)    Social Connection and Isolation Panel [NHANES]     Frequency of Communication with Friends and Family: More than three times a week     Frequency of Social Gatherings with Friends and Family: Once a week     Attends Rastafarian Services: Never     Active Member of Clubs or Organizations: No     Attends Club or Organization Meetings: Patient declined     Marital Status: Living with partner   Intimate Partner Violence: Not on file   Housing Stability: Low Risk  (1/18/2024)    Housing Stability Vital Sign     Unable to Pay for Housing in the Last Year: No     Number of Places Lived in the Last Year: 1     Unstable Housing in the Last Year: No       Current Outpatient Medications  "  Medication    carbidopa-levodopa (SINEMET)  MG Tab     No current facility-administered medications for this visit.       No Known Allergies    DATA / RESULTS      25-Hydroxy   Vitamin D 25   Date Value Ref Range Status   03/15/2024 19 (L) 30 - 100 ng/mL Final     Comment:     Adult Ranges:   <20 ng/mL - Deficiency  20-29 ng/mL - Insufficiency   ng/mL - Sufficiency  Electrochemiluminescence binding assay performed using Roche robni e  immunoassay analyzer.  The Elecsys Vitamin D total II assay is intended for  the quantitative determination of total 25 hydroxyvitamin D in human serum  and plasma. This assay is to be used as an aid in the assessment of vitamin  D sufficiency in adults.       TSH   Date Value Ref Range Status   03/15/2024 2.110 0.380 - 5.330 uIU/mL Final     Comment:     The 2011 American Thyroid Association (LITTLE) guidelines  recommended that the interpretation of thyroid function in  pregnancy be based on trimester specific reference ranges.    1st Trimester  0.100-2.500 mIU/L  2nd Trimester  0.200-3.000 mIU/L  3rd Trimester  0.300-3.500 mIU/L    These established reference ranges have not been validated  at [facility].       LDL   Date Value Ref Range Status   03/15/2024 94 <100 mg/dL Final        - MRI Brain without contrast: 3/2024  No acute process. A few scattered nonspecific T2 hyperintensities are present in the deep white matter, within expected limits for the age of the patient, most likely related to leukoariosis.    - TSH, Vit B12, D, ceruloplasmin 3/2024: Labs normal except for vitamin D deficiency (19).        OBJECTIVE      Vitals:    03/26/24 1344   BP: 118/80   BP Location: Left arm   Patient Position: Sitting   BP Cuff Size: Adult   Pulse: 75   Resp: 18   Temp: 36.9 °C (98.5 °F)   TempSrc: Temporal   SpO2: 97%   Weight: 62.6 kg (138 lb 0.1 oz)   Height: 1.727 m (5' 8\")       Physical Exam     CN7: +hypomimia    Abnormal movements:    UPDRS Right Left "   Finger tapping 1 better 2   Hand Movement 1 better 2   Toe Tapping 1 better 1 better   Leg Agility 1 better 1 better   Rigidity 2 2   Rest Tremor 0 2 when testing BK   Postural Tremor 1 1   Kinetic Tremor 2 2      Cerebellar: No dysmetria with FTN    Gait:   Posture - normal   Base - narrow   Stride length - normal   Arm swing - minimal bilaterally   Speed - slow  Shuffling/freezing - none  U-Turn - 3-4 steps (better today)    PROCEDURE     N/A

## 2024-03-26 NOTE — PATIENT INSTRUCTIONS
Parkinson's disease  - INCREASE carbidopa/levodopa 25/100mg tablet: 1.5 tab, three times a day  Take the first dose when you wake up, then 5 hours apart. Take it 30 min before food. If it causes nausea, take it with carbs (banana/ cracker) but avoid proteins (nuts/diary/meat) for 30 min. If you have already eaten protein, wait 1 hour before taking medication.    Vit D deficiency  - continue Vit D 5000IU daily

## 2024-03-26 NOTE — TELEPHONE ENCOUNTER
Received Refill PA request via [facility]  for Carbidopa-Levodopa  MG Tabs. (Quantity:135, Day Supply:30) - Copay $17.06 (No PA req'd) - Max 30 DS (We CANNOT Fill 90 DS)     Insurance: GILBERTO [employer]  Member ID:  [id number]  BIN: [id number]  PCN: [id number]  Group: [id number]     Ran Test claim via [place] & medication Pays for a $17.06 copay. Will outreach to patient to offer specialty pharmacy services and or release to preferred pharmacy

## 2024-04-02 ENCOUNTER — PHARMACY VISIT (OUTPATIENT)
Dept: PHARMACY | Facility: MEDICAL CENTER | Age: 54
End: 2024-04-02
Payer: COMMERCIAL

## 2024-04-26 PROCEDURE — RXMED WILLOW AMBULATORY MEDICATION CHARGE: Performed by: INTERNAL MEDICINE

## 2024-05-21 ENCOUNTER — PHARMACY VISIT (OUTPATIENT)
Dept: PHARMACY | Facility: MEDICAL CENTER | Age: 54
End: 2024-05-21
Payer: COMMERCIAL

## 2024-05-23 ENCOUNTER — TELEPHONE (OUTPATIENT)
Dept: PHARMACY | Facility: MEDICAL CENTER | Age: 54
End: 2024-05-23
Payer: COMMERCIAL

## 2024-05-23 DIAGNOSIS — G20.A1 PARKINSON'S DISEASE WITHOUT DYSKINESIA OR FLUCTUATING MANIFESTATIONS (HCC): ICD-10-CM

## 2024-05-23 NOTE — TELEPHONE ENCOUNTER
Received Refill PA request via [facility]  for carbidopa-levodopa (SINEMET)  MG Tab . (Quantity:373, Day Supply:83)     Insurance: GILBERTO  Member ID:  [id number]  BIN: [id number]  PCN: [id number]  Group: [id number]     Ran Test claim via [place] & medication  REFILL TOO SOON UNTIL 06/12/2024. MAX DAYS SUPPLY ALLOWED IS 83// LAST FILL 05/21/24 AT YOUR PHARM

## 2024-05-23 NOTE — TELEPHONE ENCOUNTER
Received request via: Pharmacy    Medication Name/Dosage carbidopa-levodopa (SINEMET)  MG Tab     When was medication last prescribed 03/26/24    How many refills were previously provided 3    How many Refills does he patient have left from last prescription 3    Was the patient seen in the last year in this department? Yes   Date of last office visit 03/26/24     Per last Neurology Office Visit, when was the date of next follow up visit set for?                            Date of office visit follow up request 3 months     Does the patient have an upcoming appointment? Yes   If yes, when 06/25/24             If no, schedule appointment N/A    Does the patient have detention Plus and need 100 day supply (blood pressure, diabetes and cholesterol meds only)? Patient does not have SCP

## 2024-05-24 ENCOUNTER — PHARMACY VISIT (OUTPATIENT)
Dept: PHARMACY | Facility: MEDICAL CENTER | Age: 54
End: 2024-05-24
Payer: COMMERCIAL

## 2024-05-24 PROCEDURE — RXMED WILLOW AMBULATORY MEDICATION CHARGE: Performed by: INTERNAL MEDICINE

## 2024-05-28 ENCOUNTER — TELEPHONE (OUTPATIENT)
Dept: NEUROLOGY | Facility: MEDICAL CENTER | Age: 54
End: 2024-05-28
Payer: COMMERCIAL

## 2024-05-28 DIAGNOSIS — G20.A1 PARKINSON'S DISEASE WITHOUT DYSKINESIA OR FLUCTUATING MANIFESTATIONS (HCC): ICD-10-CM

## 2024-05-28 NOTE — TELEPHONE ENCOUNTER
Received 2nd request from [facility] pharmacy for Carbidopa-Levodopa  25-100mg for 90 day supply. It looks like Rx was sent to [facility], can you please send it [facility].

## 2024-05-29 ENCOUNTER — TELEPHONE (OUTPATIENT)
Dept: NEUROLOGY | Facility: MEDICAL CENTER | Age: 54
End: 2024-05-29
Payer: COMMERCIAL

## 2024-05-29 NOTE — TELEPHONE ENCOUNTER
Received Refill PA request via [facility]  for carbidopa-levodopa (SINEMET)  MG Tab . (Quantity:374, Day Supply:83)     Insurance: GILBERTO  Member ID:  [id number]  BIN: [id number]  PCN: [id number]  Group: [id number]     Ran Test claim via [place] & medication  REFILL TOO SOON UNTIL 06/14/2024. MAX DAYS SUPPLY ALLOWED IS 83// LAST FILL 05/24/24 AT YOUR PHARM

## 2024-06-10 DIAGNOSIS — G20.A1 PARKINSON'S DISEASE WITHOUT DYSKINESIA OR FLUCTUATING MANIFESTATIONS (HCC): ICD-10-CM

## 2024-06-10 NOTE — TELEPHONE ENCOUNTER
Received request via: Pharmacy    Medication Name/Dosage carbidopa-levodopa (SINEMET)  MG Tab     When was medication last prescribed 05/28/24    How many refills were previously provided 3    How many Refills does he patient have left from last prescription 2    Was the patient seen in the last year in this department? Yes   Date of last office visit 03/26/24     Per last Neurology Office Visit, when was the date of next follow up visit set for?                            Date of office visit follow up request 3 months     Does the patient have an upcoming appointment? Yes   If yes, when 06/25/24             If no, schedule appointment N/A    Does the patient have residential Plus and need 100 day supply (blood pressure, diabetes and cholesterol meds only)? Patient does not have SCP

## 2024-06-17 ENCOUNTER — TELEPHONE (OUTPATIENT)
Dept: NEUROLOGY | Facility: MEDICAL CENTER | Age: 54
End: 2024-06-17
Payer: COMMERCIAL

## 2024-06-25 ENCOUNTER — OFFICE VISIT (OUTPATIENT)
Dept: NEUROLOGY | Facility: MEDICAL CENTER | Age: 54
End: 2024-06-25
Attending: INTERNAL MEDICINE
Payer: COMMERCIAL

## 2024-06-25 VITALS
HEART RATE: 72 BPM | TEMPERATURE: 97.8 F | HEIGHT: 68 IN | RESPIRATION RATE: 20 BRPM | DIASTOLIC BLOOD PRESSURE: 62 MMHG | WEIGHT: 138.01 LBS | OXYGEN SATURATION: 99 % | SYSTOLIC BLOOD PRESSURE: 110 MMHG | BODY MASS INDEX: 20.92 KG/M2

## 2024-06-25 DIAGNOSIS — E55.9 VITAMIN D DEFICIENCY: ICD-10-CM

## 2024-06-25 DIAGNOSIS — G20.A1 PARKINSON'S DISEASE WITHOUT DYSKINESIA OR FLUCTUATING MANIFESTATIONS (HCC): ICD-10-CM

## 2024-06-25 PROCEDURE — 99211 OFF/OP EST MAY X REQ PHY/QHP: CPT | Performed by: INTERNAL MEDICINE

## 2024-06-25 ASSESSMENT — FIBROSIS 4 INDEX: FIB4 SCORE: 2.29

## 2024-06-25 NOTE — PATIENT INSTRUCTIONS
- carbidopa/levodopa 25/100mg tablet: 1.5 tab, three times a day    - Vit D recheck in 6/2024  - continue Vit D 5000IU daily

## 2024-06-25 NOTE — PROGRESS NOTES
[facility]  [address] [place] [address], [address]. [place] [address]  Phone: [phone]    Jeremiah Childs DO  Neurology, Movement Disorders  Patient Name: Diego Giron  : 1970  MRN: [id number]     ASSESSMENT / PLAN       Diego Giron is a 53 y.o. RHD male presenting for tremors and rigidity    Parkinson's disease  Onset approx , diagnosis in . MRI Brain 3/2024 unremarkable.   Exam notable for left > right bradykinesia and mild rest tremor. Has been responsive to C/L, improvements in motor and non-motor symptoms.  He started additional exercises and doing well    - carbidopa/levodopa 25/100mg tablet: 1.5 tab, three times a day  - Possible 1cm shorter left leg s/p hip fixation, consider insole in left shoe.     Vit D deficiency  Vit D 3/2024: 19 L  - recheck in 2024  - continue Vit D 5000IU daily    Orders Placed This Encounter    VITAMIN D,25 HYDROXY (DEFICIENCY)     Return in about 6 months (around 2024).    BILLING DOCUMENTATION:   Excluding time spent on procedures during visit, I spent 30 minutes reviewing the medical record, interviewing and examining the patient, discussing diagnosis and treatment, and coordinating care.        HISTORY OF PRESENT ILLNESS      Diego Giron is a 53 y.o. RHD male presenting for tremors and rigidity    Initial HPI 24  Onset: approx , tremor in both hands. Dexterity not as good. Overall feeling more rigid, more drooling. Handwriting worse at least 1.5 years ago. More trouble getting dressed.    Neuroleptics/pesticide exposure: ages [age] summers on a farm,  planes  Anosmia: no changes  Family history: grandmother had head tremor    Current Regimen  carbidopa/levodopa 25/100mg tablet: 1.5 tab, three times a day (8AM, 1PM, 6PM)  Latency: 30 min  Duration: no wearing off  Efficacy: better with swallowing, dexterity, voice, tremors, mood  Dyskinesia/Dystonia: none  Sfx: GI symptoms better with  food    ROS:  Gait: had left hip fracture 2020, but walking had changed. More shuffling. Longer distances more challenging. Riding bike, [employer].   Orthostasis: no issues  Constipation: no issues. He said he is a vegan. Previously vegetarian. Intermittent constipation. Psyllium and prunes helped.   Urinary issues: no issues  Speech/Swallow: Voice quieter. Harder to swallow later in day. Better now.  Cognition: no issues  Mood: no anxiety, no depression  Hallucinations: no issues  Sleep/RBD: no issues, no snoring, no RBD        1/23/2024     8:20 AM 9/30/2021     9:00 AM   PHQ-9 Screening   Little interest or pleasure in doing things 0 - not at all 0 - not at all   Feeling down, depressed, or hopeless 0 - not at all 0 - not at all   PHQ-2 Total Score 0 0       Past Medical History:   Diagnosis Date    Closed fracture of neck of left femur (HCC) 09/28/2020       Past Surgical History:   Procedure Laterality Date    PB OPEN FIX INTER/SUBTROCH FX,IMPLNT Left 9/29/2020    Procedure: INSERTION, INTRAMEDULLARY ELIESER, FEMUR, PROXIMAL;  Surgeon: Akash Driver M.D.;  Location: SURGERY [facility];  Service: Orthopedics       Family History   Problem Relation Age of Onset    Cancer Mother         breast cancer in remission    Drug abuse Father         possible liver failure    No Known Problems Brother     No Known Problems Half-sister     No Known Problems Half-sister        Social History     Socioeconomic History    Marital status: Single     Spouse name: Not on file    Number of children: 0    Years of education: Not on file    Highest education level: Master's degree (e.g., MA, MS, Dwayne, MEd, MSW, KELSIE)   Occupational History    Occupation:  - currently unemployed   Tobacco Use    Smoking status: Never    Smokeless tobacco: Never   Vaping Use    Vaping status: Never Used   Substance and Sexual Activity    Alcohol use: Not Currently     Comment: experimented ETOH in high school    Drug use: Never    Sexual  activity: Not on file   Other Topics Concern    Not on file   Social History Narrative    Not on file     Social Determinants of Health     Financial Resource Strain: Low Risk  (1/18/2024)    Overall Financial Resource Strain (CARDIA)     Difficulty of Paying Living Expenses: Not very hard   Food Insecurity: No Food Insecurity (1/18/2024)    Hunger Vital Sign     Worried About Running Out of Food in the Last Year: Never true     Ran Out of Food in the Last Year: Never true   Transportation Needs: No Transportation Needs (1/18/2024)    PRAPARE - Transportation     Lack of Transportation (Medical): No     Lack of Transportation (Non-Medical): No   Physical Activity: Sufficiently Active (1/18/2024)    Exercise Vital Sign     Days of Exercise per Week: 5 days     Minutes of Exercise per Session: 50 min   Stress: No Stress Concern Present (1/18/2024)    [employer] [place] [employer] - Occupational Stress Questionnaire     Feeling of Stress : Not at all   Social Connections: Moderately Isolated (1/18/2024)    Social Connection and Isolation Panel [NHANES]     Frequency of Communication with Friends and Family: More than three times a week     Frequency of Social Gatherings with Friends and Family: Once a week     Attends Scientologist Services: Never     Active Member of Clubs or Organizations: No     Attends Club or Organization Meetings: Patient declined     Marital Status: Living with partner   Intimate Partner Violence: Not on file   Housing Stability: Low Risk  (1/18/2024)    Housing Stability Vital Sign     Unable to Pay for Housing in the Last Year: No     Number of Places Lived in the Last Year: 1     Unstable Housing in the Last Year: No       Current Outpatient Medications   Medication    carbidopa-levodopa (SINEMET)  MG Tab     No current facility-administered medications for this visit.       No Known Allergies    DATA / RESULTS      25-Hydroxy   Vitamin D 25   Date Value Ref Range Status  "  03/15/2024 19 (L) 30 - 100 ng/mL Final     Comment:     Adult Ranges:   <20 ng/mL - Deficiency  20-29 ng/mL - Insufficiency   ng/mL - Sufficiency  Electrochemiluminescence binding assay performed using Roche robin e  immunoassay analyzer.  The Elecsys Vitamin D total II assay is intended for  the quantitative determination of total 25 hydroxyvitamin D in human serum  and plasma. This assay is to be used as an aid in the assessment of vitamin  D sufficiency in adults.       TSH   Date Value Ref Range Status   03/15/2024 2.110 0.380 - 5.330 uIU/mL Final     Comment:     The 2011 American Thyroid Association (LITTLE) guidelines  recommended that the interpretation of thyroid function in  pregnancy be based on trimester specific reference ranges.    1st Trimester  0.100-2.500 mIU/L  2nd Trimester  0.200-3.000 mIU/L  3rd Trimester  0.300-3.500 mIU/L    These established reference ranges have not been validated  at [facility].       LDL   Date Value Ref Range Status   03/15/2024 94 <100 mg/dL Final        MRI Brain without contrast: 3/2024  No acute process. A few scattered nonspecific T2 hyperintensities are present in the deep white matter, within expected limits for the age of the patient, most likely related to leukoariosis.    TSH, Vit B12, D, ceruloplasmin 3/2024: Labs normal except for vitamin D deficiency (19)    OBJECTIVE      Vitals:    06/25/24 1335   BP: 110/62   BP Location: Left arm   Patient Position: Sitting   BP Cuff Size: Adult   Pulse: 72   Resp: 20   Temp: 36.6 °C (97.8 °F)   TempSrc: Temporal   SpO2: 99%   Weight: 62.6 kg (138 lb 0.1 oz)   Height: 1.727 m (5' 8\")     Physical Exam   C/L 1.5 tab at noon (2 hours ago)    CN7: +hypomimia    Abnormal movements:    UPDRS Right Left   Finger tapping 1 better 2   Hand Movement 1 better 2   Toe Tapping 1 better 1 better   Leg Agility 1 better 1 better   Rigidity 2 2   Rest Tremor 0 0 better   Postural Tremor 1 1   Kinetic Tremor 2 2    "   Cerebellar: No dysmetria with FTN    Gait:   Posture - normal   Base - narrow   Stride length - normal   Arm swing - minimal bilaterally   Speed - slow  Shuffling/freezing - none  U-Turn - 3-4 steps (better today)    PROCEDURE     N/A

## 2024-07-01 ENCOUNTER — TELEPHONE (OUTPATIENT)
Dept: NEUROLOGY | Facility: MEDICAL CENTER | Age: 54
End: 2024-07-01
Payer: COMMERCIAL

## 2024-10-15 ENCOUNTER — HOSPITAL ENCOUNTER (OUTPATIENT)
Dept: LAB | Facility: MEDICAL CENTER | Age: 54
End: 2024-10-15
Attending: INTERNAL MEDICINE
Payer: COMMERCIAL

## 2024-10-15 DIAGNOSIS — E55.9 VITAMIN D DEFICIENCY: ICD-10-CM

## 2024-10-15 LAB — 25(OH)D3 SERPL-MCNC: 60 NG/ML (ref 30–100)

## 2024-10-15 PROCEDURE — 36415 COLL VENOUS BLD VENIPUNCTURE: CPT

## 2024-10-15 PROCEDURE — 82306 VITAMIN D 25 HYDROXY: CPT

## 2024-12-23 ENCOUNTER — TELEPHONE (OUTPATIENT)
Dept: NEUROLOGY | Facility: MEDICAL CENTER | Age: 54
End: 2024-12-23
Payer: COMMERCIAL

## 2024-12-23 NOTE — TELEPHONE ENCOUNTER
NEUROLOGY PATIENT PRE-VISIT PLANNING     Patient was NOT contacted to complete PVP.  Note: Patient will not be contacted if there is no indication to call.     Patient Appointment is scheduled as: Established Patient     Is visit type and length scheduled correctly? Yes    [employer] Patient is checked in Patient Demographics? Yes    3.   Is referral attached to visit? Yes    4. Were records received from referring provider? No    4. Patient was NOT contacted to have someone accompany them to visit.     5. Is this appointment scheduled as a Hospital Follow-Up?  No    6. Does the patient require any pre procedure or post procedure follow up? No    7. If any orders were placed at last visit or intended to be done for this visit do we have Results/Consult Notes? Yes  Labs - Labs ordered, completed on 10/15/24 and results are in chart.  Imaging - Imaging was not ordered at last office visit.  Referrals - No referrals were ordered at last office visit.  Note: If patient appointment is for lab or imaging review and patient did not complete the studies, check with provider if OK to reschedule patient until completed.    8. If patient appointment is for Botox - is order pended for provider? N/A    9. Was Plan Assessment from last Neurology Office Visit Reviewed?  Yes

## 2024-12-31 ENCOUNTER — OFFICE VISIT (OUTPATIENT)
Dept: NEUROLOGY | Facility: MEDICAL CENTER | Age: 54
End: 2024-12-31
Attending: PSYCHIATRY & NEUROLOGY
Payer: COMMERCIAL

## 2024-12-31 VITALS
TEMPERATURE: 98.2 F | RESPIRATION RATE: 16 BRPM | BODY MASS INDEX: 21.52 KG/M2 | OXYGEN SATURATION: 98 % | HEART RATE: 72 BPM | DIASTOLIC BLOOD PRESSURE: 78 MMHG | WEIGHT: 145.28 LBS | SYSTOLIC BLOOD PRESSURE: 118 MMHG | HEIGHT: 69 IN

## 2024-12-31 DIAGNOSIS — G20.A1 PARKINSON'S DISEASE WITHOUT DYSKINESIA OR FLUCTUATING MANIFESTATIONS (HCC): ICD-10-CM

## 2024-12-31 PROCEDURE — 99211 OFF/OP EST MAY X REQ PHY/QHP: CPT | Performed by: INTERNAL MEDICINE

## 2024-12-31 RX ORDER — CARBIDOPA AND LEVODOPA 25; 100 MG/1; MG/1
1.5 TABLET ORAL 3 TIMES DAILY
Qty: 405 TABLET | Refills: 3 | Status: SHIPPED | OUTPATIENT
Start: 2024-12-31 | End: 2025-12-31

## 2024-12-31 ASSESSMENT — FIBROSIS 4 INDEX: FIB4 SCORE: 2.29

## 2024-12-31 ASSESSMENT — PATIENT HEALTH QUESTIONNAIRE - PHQ9: CLINICAL INTERPRETATION OF PHQ2 SCORE: 0

## 2024-12-31 NOTE — PROGRESS NOTES
Jeremiah Childs,   Neurology, Movement Disorders [facility]  [address], [address]. [place] [address]  Phone: [phone], Fax: [phone]     ASSESSMENT / PLAN   Diego Giron is a 54 y.o. RHD male presenting for tremors and rigidity     Parkinson's disease  Onset approx 2021, diagnosis in 2024. MRI Brain 3/2024 unremarkable.   Exam notable for left > right bradykinesia and mild rest tremor. Has been responsive to C/L, improvements in motor and non-motor symptoms.  He started additional exercises and doing well     - carbidopa/levodopa 25/100mg tablet: 1.5 tab, three times a day  - Possible 1cm shorter left leg s/p hip fixation, consider insole in left shoe.      Vit D deficiency  Vit D 3/2024: 19 L. Recheck 10/2024: 60, NORMAL  - continue Vit D 5000IU daily    Orders Placed This Encounter    carbidopa-levodopa (SINEMET)  MG Tab     Return in about 6 months (around 6/30/2025).    BILLING DOCUMENTATION:   Excluding time spent on procedures during visit, I spent 20 minutes reviewing the medical record, interviewing and examining the patient, discussing diagnosis and treatment, and coordinating care.              HISTORY OF PRESENT ILLNESS   Diego Giron is a 54 y.o.  RHD male presenting for tremors and rigidity     Initial HPI 01/25/24  Onset: approx 2021/2022, tremor in both hands. Dexterity not as good. Overall feeling more rigid, more drooling. Handwriting worse at least 1.5 years ago. More trouble getting dressed.    Neuroleptics/pesticide exposure: ages [age] summers on a farm,  planes  Anosmia: no changes  Family history: grandmother had head tremor      Interval history  1/25/24: first visit with me. Dx with PD. Started carbidopa-levodopa  3/26/24: increase carbidopa-levodopa 1.5 tab TID  6/25/24: no med changes  12/31/24: no med changes       Current Regimen  carbidopa/levodopa 25/100mg tablet: 1.5 tab, three times a day (8AM, 1PM, 6PM)  Latency: 30  "min  Duration: no wearing off  Efficacy: better with swallowing, dexterity, voice, tremors, mood  Dyskinesia/Dystonia: none  Sfx: GI symptoms better with food. Otherwise might feel \"hot flash\" and sweaty     ROS:  Gait:   had left hip fracture 2020, but walking had changed. More shuffling. Longer distances more challenging. Riding bike, [employer].   12/31/24: Doing well, hiking and [employer]  Orthostasis:   no issues  Constipation:   no issues. He said he is a vegan. Previously vegetarian. Intermittent constipation. Psyllium and prunes helped.   Urinary issues:   no issues  Speech/Swallow:   Voice quieter. Harder to swallow later in day. Better now.  Cognition:   no issues  Mood:   no anxiety, no depression  Hallucinations:   no issues  Sleep/RBD:   no issues, no snoring, no RBD      Past Medical History:   Diagnosis Date    Closed fracture of neck of left femur (HCC) 09/28/2020     Past Surgical History:   Procedure Laterality Date    PB OPEN FIX INTER/SUBTROCH FX,IMPLNT Left 9/29/2020    Procedure: INSERTION, INTRAMEDULLARY ELIESER, FEMUR, PROXIMAL;  Surgeon: Akash Driver M.D.;  Location: SURGERY [facility];  Service: Orthopedics     Family History   Problem Relation Age of Onset    Cancer Mother         breast cancer in remission    Drug abuse Father         possible liver failure    No Known Problems Brother     No Known Problems Half-sister     No Known Problems Half-sister      Social History     Socioeconomic History    Marital status: Single     Spouse name: Not on file    Number of children: 0    Years of education: Not on file    Highest education level: Master's degree (e.g., MA, MS, Dwayne, MEd, MSW, KELSIE)   Occupational History    Occupation:  - currently unemployed   Tobacco Use    Smoking status: Never    Smokeless tobacco: Never   Vaping Use    Vaping status: Never Used   Substance and Sexual Activity    Alcohol use: Not Currently     Comment: experimented ETOH in high school    Drug use: " Never    Sexual activity: Not on file   Other Topics Concern    Not on file   Social History Narrative    Not on file     Social Drivers of Health     Financial Resource Strain: Low Risk  (1/18/2024)    Overall Financial Resource Strain (CARDIA)     Difficulty of Paying Living Expenses: Not very hard   Food Insecurity: No Food Insecurity (1/18/2024)    Hunger Vital Sign     Worried About Running Out of Food in the Last Year: Never true     Ran Out of Food in the Last Year: Never true   Transportation Needs: No Transportation Needs (1/18/2024)    PRAPARE - Transportation     Lack of Transportation (Medical): No     Lack of Transportation (Non-Medical): No   Physical Activity: Sufficiently Active (1/18/2024)    Exercise Vital Sign     Days of Exercise per Week: 5 days     Minutes of Exercise per Session: 50 min   Stress: No Stress Concern Present (1/18/2024)    [employer] [place] [employer] - Occupational Stress Questionnaire     Feeling of Stress : Not at all   Social Connections: Moderately Isolated (1/18/2024)    Social Connection and Isolation Panel [NHANES]     Frequency of Communication with Friends and Family: More than three times a week     Frequency of Social Gatherings with Friends and Family: Once a week     Attends Anglican Services: Never     Active Member of Clubs or Organizations: No     Attends Club or Organization Meetings: Patient declined     Marital Status: Living with partner   Intimate Partner Violence: Not on file   Housing Stability: Low Risk  (1/18/2024)    Housing Stability Vital Sign     Unable to Pay for Housing in the Last Year: No     Number of Places Lived in the Last Year: 1     Unstable Housing in the Last Year: No     Current Outpatient Medications   Medication    carbidopa-levodopa (SINEMET)  MG Tab     No current facility-administered medications for this visit.     No Known Allergies          DATA / RESULTS     MRI Brain without contrast: 3/2024  No acute  "process. A few scattered nonspecific T2 hyperintensities are present in the deep white matter, within expected limits for the age of the patient, most likely related to leukoariosis.     TSH, Vit B12, D, ceruloplasmin 3/2024: Labs normal except for vitamin D deficiency (19)      25-Hydroxy   Vitamin D 25   Date Value Ref Range Status   10/15/2024 60 30 - 100 ng/mL Final     Comment:     Adult Ranges:   <20 ng/mL - Deficiency  20-29 ng/mL - Insufficiency   ng/mL - Sufficiency  Electrochemiluminescence binding assay performed using Roche robin e  immunoassay analyzer.  The Elecsys Vitamin D total II assay is intended for  the quantitative determination of total 25 hydroxyvitamin D in human serum  and plasma. This assay is to be used as an aid in the assessment of vitamin  D sufficiency in adults.       TSH   Date Value Ref Range Status   03/15/2024 2.110 0.380 - 5.330 uIU/mL Final     Comment:     The 2011 American Thyroid Association (LITTLE) guidelines  recommended that the interpretation of thyroid function in  pregnancy be based on trimester specific reference ranges.    1st Trimester  0.100-2.500 mIU/L  2nd Trimester  0.200-3.000 mIU/L  3rd Trimester  0.300-3.500 mIU/L    These established reference ranges have not been validated  at [facility].       LDL   Date Value Ref Range Status   03/15/2024 94 <100 mg/dL Final                OBJECTIVE      Vitals:    12/31/24 1254   BP: 118/78   BP Location: Left arm   Patient Position: Sitting   BP Cuff Size: Adult   Pulse: 72   Resp: 16   Temp: 36.8 °C (98.2 °F)   TempSrc: Temporal   SpO2: 98%   Weight: 65.9 kg (145 lb 4.5 oz)   Height: 1.753 m (5' 9\")     Physical Exam   C/L 1.5 tab at 930AM (4 hours ago)     +hypomimia    UPDRS Right Left   Finger tapping 1  1 better   Hand Movement 1 1 better   Toe Tapping 1 1    Leg Agility 1 1    Rigidity 1 better 2   Rest Tremor 0 0   Postural Tremor 1 1   Kinetic Tremor 1 better 1 better      Cerebellar: No " dysmetria with FTN     Gait:   Posture - normal   Base - narrow   Stride length - normal   Arm swing - normal (Better)   Speed - normal (better)  Shuffling/freezing - none  U-Turn - 3-4 steps          PROCEDURE     N/A

## 2024-12-31 NOTE — PATIENT INSTRUCTIONS
[facility]  Josr Desai MD, PhD  MD Cem Arias MD  [phone]      Cem Coleman MD, PhD  [address] [place] [address]  [place]  [phone]      Mia Bailey MD  [facility]  [address] [place] [address]  [facility]  [phone]      Vivian Benedict MD  [facility]  [address]  [place] [address]  [facility]  [phone]      [facility] [place]  [facility]  [address] [place], [place] [address] [phone]

## 2025-07-02 ENCOUNTER — APPOINTMENT (OUTPATIENT)
Dept: NEUROLOGY | Facility: MEDICAL CENTER | Age: 55
End: 2025-07-02
Attending: INTERNAL MEDICINE
Payer: COMMERCIAL

## 2025-07-02 NOTE — PROGRESS NOTES
Jeremiah Childs,   Neurology, Movement Disorders [facility]  [address], [address]. [place] [address]  Phone: [phone], Fax: [phone]     ASSESSMENT / PLAN   Diego Giron is a 54 y.o. RHD male presenting for tremors and rigidity     Parkinson's disease  Onset approx 2021, diagnosis in 2024. MRI Brain 3/2024 unremarkable.   Exam notable for left > right bradykinesia and mild rest tremor. Has been responsive to C/L, improvements in motor and non-motor symptoms.  He started additional exercises and doing well     - carbidopa/levodopa 25/100mg tablet: 1.5 tab, three times a day  - Possible 1cm shorter left leg s/p hip fixation, consider insole in left shoe.      Vit D deficiency  Vit D 3/2024: 19 L. Recheck 10/2024: 60, NORMAL  - continue Vit D 5000IU daily      No orders of the defined types were placed in this encounter.    No follow-ups on file.    BILLING DOCUMENTATION:   Excluding time spent on procedures during visit, I spent 20 minutes reviewing the medical record, interviewing and examining the patient, discussing diagnosis and treatment, and coordinating care.              HISTORY OF PRESENT ILLNESS   Diego Giron is a 54 y.o.  RHD male presenting for tremors and rigidity     Initial HPI 01/25/24  Onset: approx 2021/2022, tremor in both hands. Dexterity not as good. Overall feeling more rigid, more drooling. Handwriting worse at least 1.5 years ago. More trouble getting dressed.    Neuroleptics/pesticide exposure: ages [age] summers on a farm,  planes  Anosmia: no changes  Family history: grandmother had head tremor      Interval history  1/25/24: first visit with me. Dx with PD. Started carbidopa-levodopa  3/26/24: increase carbidopa-levodopa 1.5 tab TID  6/25/24: no med changes  12/31/24: no med changes  07/02/25:        Current Regimen  carbidopa/levodopa 25/100mg tablet: 1.5 tab, three times a day (8AM, 1PM, 6PM)  Latency: 30 min  Duration: no  "wearing off  Efficacy: better with swallowing, dexterity, voice, tremors, mood  Dyskinesia/Dystonia: none  Sfx: GI symptoms better with food. Otherwise might feel \"hot flash\" and sweaty       ROS:  Gait:   had left hip fracture 2020, but walking had changed. More shuffling. Longer distances more challenging. Riding bike, [employer].   12/31/24: Doing well, hiking and [employer]  Orthostasis:   no issues  Constipation:   no issues. He said he is a vegan. Previously vegetarian. Intermittent constipation. Psyllium and prunes helped.   Urinary issues:   no issues  Speech/Swallow:   Voice quieter. Harder to swallow later in day. Better now.  Cognition:   no issues  Mood:   no anxiety, no depression  Hallucinations:   no issues  Sleep/RBD:   no issues, no snoring, no RBD      Past Medical History:   Diagnosis Date    Closed fracture of neck of left femur (HCC) 09/28/2020     Past Surgical History:   Procedure Laterality Date    PB OPEN FIX INTER/SUBTROCH FX,IMPLNT Left 9/29/2020    Procedure: INSERTION, INTRAMEDULLARY ELIESER, FEMUR, PROXIMAL;  Surgeon: Akash Driver M.D.;  Location: SURGERY [facility];  Service: Orthopedics     Family History   Problem Relation Age of Onset    Cancer Mother         breast cancer in remission    Drug abuse Father         possible liver failure    No Known Problems Brother     No Known Problems Half-sister     No Known Problems Half-sister      Social History     Socioeconomic History    Marital status: Single     Spouse name: Not on file    Number of children: 0    Years of education: Not on file    Highest education level: Master's degree (e.g., MA, MS, Dwayne, MEd, MSW, KELSIE)   Occupational History    Occupation:  - currently unemployed   Tobacco Use    Smoking status: Never    Smokeless tobacco: Never   Vaping Use    Vaping status: Never Used   Substance and Sexual Activity    Alcohol use: Not Currently     Comment: experimented ETOH in high school    Drug use: Never    Sexual " activity: Not on file   Other Topics Concern    Not on file   Social History Narrative    Not on file     Social Drivers of Health     Financial Resource Strain: Low Risk  (1/18/2024)    Overall Financial Resource Strain (CARDIA)     Difficulty of Paying Living Expenses: Not very hard   Food Insecurity: No Food Insecurity (1/18/2024)    Hunger Vital Sign     Worried About Running Out of Food in the Last Year: Never true     Ran Out of Food in the Last Year: Never true   Transportation Needs: No Transportation Needs (1/18/2024)    PRAPARE - Transportation     Lack of Transportation (Medical): No     Lack of Transportation (Non-Medical): No   Physical Activity: Sufficiently Active (1/18/2024)    Exercise Vital Sign     Days of Exercise per Week: 5 days     Minutes of Exercise per Session: 50 min   Stress: No Stress Concern Present (1/18/2024)    [employer] [place] [employer] - Occupational Stress Questionnaire     Feeling of Stress : Not at all   Social Connections: Moderately Isolated (1/18/2024)    Social Connection and Isolation Panel [NHANES]     Frequency of Communication with Friends and Family: More than three times a week     Frequency of Social Gatherings with Friends and Family: Once a week     Attends Hoahaoism Services: Never     Active Member of Clubs or Organizations: No     Attends Club or Organization Meetings: Patient declined     Marital Status: Living with partner   Intimate Partner Violence: Not on file   Housing Stability: Low Risk  (1/18/2024)    Housing Stability Vital Sign     Unable to Pay for Housing in the Last Year: No     Number of Places Lived in the Last Year: 1     Unstable Housing in the Last Year: No     Current Outpatient Medications   Medication    carbidopa-levodopa (SINEMET)  MG Tab     No current facility-administered medications for this visit.     No Known Allergies          DATA / RESULTS     MRI Brain without contrast: 3/2024  No acute process. A few  scattered nonspecific T2 hyperintensities are present in the deep white matter, within expected limits for the age of the patient, most likely related to leukoariosis.     TSH, Vit B12, D, ceruloplasmin 3/2024: Labs normal except for vitamin D deficiency (19)      25-Hydroxy   Vitamin D 25   Date Value Ref Range Status   10/15/2024 60 30 - 100 ng/mL Final     Comment:     Adult Ranges:   <20 ng/mL - Deficiency  20-29 ng/mL - Insufficiency   ng/mL - Sufficiency  Electrochemiluminescence binding assay performed using Roche robin e  immunoassay analyzer.  The Elecsys Vitamin D total II assay is intended for  the quantitative determination of total 25 hydroxyvitamin D in human serum  and plasma. This assay is to be used as an aid in the assessment of vitamin  D sufficiency in adults.       TSH   Date Value Ref Range Status   03/15/2024 2.110 0.380 - 5.330 uIU/mL Final     Comment:     The 2011 American Thyroid Association (LITTLE) guidelines  recommended that the interpretation of thyroid function in  pregnancy be based on trimester specific reference ranges.    1st Trimester  0.100-2.500 mIU/L  2nd Trimester  0.200-3.000 mIU/L  3rd Trimester  0.300-3.500 mIU/L    These established reference ranges have not been validated  at [facility].       LDL   Date Value Ref Range Status   03/15/2024 94 <100 mg/dL Final                OBJECTIVE      There were no vitals filed for this visit.    Physical Exam   C/L 1.5 tab at 930AM (4 hours ago)     +hypomimia    UPDRS Right Left   Finger tapping 1  1 better   Hand Movement 1 1 better   Toe Tapping 1 1    Leg Agility 1 1    Rigidity 1 better 2   Rest Tremor 0 0   Postural Tremor 1 1   Kinetic Tremor 1 better 1 better      Cerebellar: No dysmetria with FTN     Gait:   Posture - normal   Base - narrow   Stride length - normal   Arm swing - normal (Better)   Speed - normal (better)  Shuffling/freezing - none  U-Turn - 3-4 steps          PROCEDURE     N/A

## (undated) DEVICE — GLOVE SZ 7 BIOGEL PI MICRO - PF LF (50PR/BX 4BX/CA)

## (undated) DEVICE — GLOVE BIOGEL INDICATOR SZ 8 SURGICAL PF LTX - (50/BX 4BX/CA)

## (undated) DEVICE — DRAPE 36X28IN RAD CARM BND BG - (25/CA) O

## (undated) DEVICE — ELECTRODE 850 FOAM ADHESIVE - HYDROGEL RADIOTRNSPRNT (50/PK)

## (undated) DEVICE — SUCTION INSTRUMENT YANKAUER BULBOUS TIP W/O VENT (50EA/CA)

## (undated) DEVICE — GLOVE SZ 6.5 BIOGEL PI MICRO - PF LF (50PR/BX)

## (undated) DEVICE — STAPLER SKIN DISP - (6/BX 10BX/CA) VISISTAT

## (undated) DEVICE — CANISTER SUCTION 3000ML MECHANICAL FILTER AUTO SHUTOFF MEDI-VAC NONSTERILE LF DISP  (40EA/CA)

## (undated) DEVICE — MASK, LARYNGEAL AIRWAY #4

## (undated) DEVICE — GUIDE PIN CALIBRATED (5EA/PK) (4TX6=24)

## (undated) DEVICE — GLOVE BIOGEL PI INDICATOR SZ 6.5 SURGICAL PF LF - (50/BX 4BX/CA)

## (undated) DEVICE — BLADE SURGICAL CLIPPER - (50EA/CA)

## (undated) DEVICE — SUCTION INSTRUMENT YANKAUER OPEN TIP W/O VENT (50EA/CA)

## (undated) DEVICE — SUTURE 0 VICRYL PLUS CT-1 - 8 X 18 INCH (12/BX)

## (undated) DEVICE — GOWN WARMING STANDARD FLEX - (30/CA)

## (undated) DEVICE — GLOVE BIOGEL PI INDICATOR SZ 7.0 SURGICAL PF LF - (50/BX 4BX/CA)

## (undated) DEVICE — MASK ANESTHESIA ADULT  - (100/CA)

## (undated) DEVICE — SET EXTENSION WITH 2 PORTS (48EA/CA) ***PART #2C8610 IS A SUBSTITUTE*****

## (undated) DEVICE — GOWN SURGEONS X-LARGE - DISP. (30/CA)

## (undated) DEVICE — NEPTUNE 4 PORT MANIFOLD - (20/PK)

## (undated) DEVICE — TUBING CLEARLINK DUO-VENT - C-FLO (48EA/CA)

## (undated) DEVICE — SODIUM CHL IRRIGATION 0.9% 1000ML (12EA/CA)

## (undated) DEVICE — GLOVE BIOGEL PI ORTHO SZ 8 PF LF (40PR/BX)

## (undated) DEVICE — DRAPE SURGICAL U 77X120 - (10/CA)

## (undated) DEVICE — HEAD HOLDER JUNIOR/ADULT

## (undated) DEVICE — SUTURE GENERAL

## (undated) DEVICE — PROTECTOR ULNA NERVE - (36PR/CA)

## (undated) DEVICE — DRAPE C ARMOR (12EA/CA)

## (undated) DEVICE — KIT ANESTHESIA W/CIRCUIT & 3/LT BAG W/FILTER (20EA/CA)

## (undated) DEVICE — BIT DRILL LONG CALIBRATED 4.2MM X 330MM (4TX2=8)

## (undated) DEVICE — SET LEADWIRE 5 LEAD BEDSIDE DISPOSABLE ECG (1SET OF 5/EA)

## (undated) DEVICE — PACK MAJOR ORTHO - (2EA/CA)

## (undated) DEVICE — LACTATED RINGERS INJ 1000 ML - (14EA/CA 60CA/PF)

## (undated) DEVICE — TUBE CONNECT SUCTION CLEAR 120 X 1/4" (50EA/CA)"

## (undated) DEVICE — SENSOR SPO2 NEO LNCS ADHESIVE (20/BX) SEE USER NOTES

## (undated) DEVICE — SUTURE 2-0 VICRYL PLUS CT-1 - 8 X 18 INCH(12/BX)